# Patient Record
Sex: FEMALE | URBAN - METROPOLITAN AREA
[De-identification: names, ages, dates, MRNs, and addresses within clinical notes are randomized per-mention and may not be internally consistent; named-entity substitution may affect disease eponyms.]

---

## 2023-02-06 ENCOUNTER — OFFICE VISIT (OUTPATIENT)
Dept: OBGYN CLINIC | Age: 33
End: 2023-02-06

## 2023-02-06 VITALS
HEIGHT: 66 IN | BODY MASS INDEX: 30.79 KG/M2 | DIASTOLIC BLOOD PRESSURE: 64 MMHG | TEMPERATURE: 98.2 F | WEIGHT: 191.6 LBS | HEART RATE: 117 BPM | SYSTOLIC BLOOD PRESSURE: 118 MMHG

## 2023-02-06 DIAGNOSIS — Z98.891 HX OF CESAREAN SECTION: ICD-10-CM

## 2023-02-06 DIAGNOSIS — Z32.01 POSITIVE PREGNANCY TEST: ICD-10-CM

## 2023-02-06 DIAGNOSIS — Z87.42 HX OF ABNORMAL CERVICAL PAP SMEAR: ICD-10-CM

## 2023-02-06 DIAGNOSIS — Z20.2 POSSIBLE EXPOSURE TO STD: ICD-10-CM

## 2023-02-06 DIAGNOSIS — Z76.89 ENCOUNTER TO ESTABLISH CARE WITH NEW DOCTOR: ICD-10-CM

## 2023-02-06 DIAGNOSIS — Z87.59 HISTORY OF POSTPARTUM HEMORRHAGE: ICD-10-CM

## 2023-02-06 DIAGNOSIS — Z12.39 SCREENING BREAST EXAMINATION: ICD-10-CM

## 2023-02-06 DIAGNOSIS — R11.0 NAUSEA: ICD-10-CM

## 2023-02-06 DIAGNOSIS — Z12.4 PAP SMEAR FOR CERVICAL CANCER SCREENING: ICD-10-CM

## 2023-02-06 DIAGNOSIS — N91.2 AMENORRHEA: ICD-10-CM

## 2023-02-06 DIAGNOSIS — Z01.419 WOMEN'S ANNUAL ROUTINE GYNECOLOGICAL EXAMINATION: Primary | ICD-10-CM

## 2023-02-06 LAB
BACTERIA: ABNORMAL /HPF
BILIRUBIN URINE: NEGATIVE
BLOOD, URINE: NEGATIVE
CLARITY: CLEAR
COLOR: YELLOW
EPITHELIAL CELLS, UA: 8 /HPF (ref 0–5)
GLUCOSE URINE: NEGATIVE MG/DL
HYALINE CASTS: 0 /LPF (ref 0–8)
KETONES, URINE: NEGATIVE MG/DL
LEUKOCYTE ESTERASE, URINE: NEGATIVE
MICROSCOPIC EXAMINATION: ABNORMAL
NITRITE, URINE: NEGATIVE
PH UA: 5.5 (ref 5–8)
PROTEIN UA: NEGATIVE MG/DL
RBC UA: 3 /HPF (ref 0–4)
SPECIFIC GRAVITY UA: 1.03 (ref 1–1.03)
URINE TYPE: ABNORMAL
UROBILINOGEN, URINE: 0.2 E.U./DL
WBC UA: 2 /HPF (ref 0–5)

## 2023-02-06 PROCEDURE — 81025 URINE PREGNANCY TEST: CPT | Performed by: OBSTETRICS & GYNECOLOGY

## 2023-02-06 PROCEDURE — 99385 PREV VISIT NEW AGE 18-39: CPT | Performed by: OBSTETRICS & GYNECOLOGY

## 2023-02-06 RX ORDER — ONDANSETRON 4 MG/1
4 TABLET, FILM COATED ORAL EVERY 8 HOURS PRN
Qty: 30 TABLET | Refills: 3 | Status: SHIPPED | OUTPATIENT
Start: 2023-02-06

## 2023-02-06 NOTE — PROGRESS NOTES
[facility] Ob/Gyn   Annual Exam     CC:   Chief Complaint   Patient presents with    New Patient    Amenorrhea        HPI:  34 y.o. Altagracia Costa presents for her gynecologic exam. She complains of Amenorrhea. Patient's last menstrual period was 10/13/2022. Sanjay Kunz Has had a (+) home pregnancy test. Denies any VB / Cramping since that time. Admits to some nausea/vomiting. Is on prenatal vitamin. Previous pregnancies: Emergency CS, IOL for post dates, Female 8# Richard Issa, suspected Chorio -- need records from previous OB (Dr. Tiffany Mazariegos, in [place]). Screening:  Last pap smear: 2020 -- abnormal, repeat OK per pt  History of abnormal pap smears: Y  STI History: N      Review of Systems:   Review of Systems   Constitutional:  Negative for activity change, appetite change and fatigue. Eyes:  Negative for photophobia and visual disturbance. Respiratory:  Negative for shortness of breath. Cardiovascular:  Negative for chest pain, palpitations and leg swelling. Gastrointestinal:  Positive for nausea. Negative for abdominal pain and vomiting. Genitourinary:  Negative for difficulty urinating.        (+) absence of Menses   (+) breast tenderness    Skin:  Negative for color change. Neurological:  Negative for dizziness and numbness. Hematological:  Negative for adenopathy. Does not bruise/bleed easily. Psychiatric/Behavioral:  Negative for behavioral problems. The patient is not nervous/anxious. Primary Care Physician: No primary care provider on file. Obstetric History  OB History    Para Term  AB Living   1 1 1     1   SAB IAB Ectopic Molar Multiple Live Births             1      # Outcome Date GA Lbr Omid/2nd Weight Sex Delivery Anes PTL Lv   1 Term 21    F CS-LTranv   ANN       Gynecologic History  Menstrual History:  Menarche: early teens  LMP: Patient's last menstrual period was 10/13/2022.     Menstrual Period: regular    Sexual History:  Contraception: see above  Currently IS sexually active  Denies sexual problems    Medical History:  Past Medical History:   Diagnosis Date    Abnormal Pap smear of cervix     History of blood transfusion        Medications:  Current Outpatient Medications   Medication Sig Dispense Refill    Prenatal MV-Min-Fe Fum-FA-DHA (PRENATAL 1 PO) Take by mouth      FOLIC ACID PO Take by mouth      ondansetron (ZOFRAN) 4 MG tablet Take 1 tablet by mouth every 8 hours as needed for Nausea 30 tablet 3     No current facility-administered medications for this visit. Surgical History:  Past Surgical History:   Procedure Laterality Date     SECTION         Allergies: Allergies   Allergen Reactions    Sulfa Antibiotics        Family History:  Family History   Problem Relation Age of Onset    No Known Problems Father     No Known Problems Mother        Social History:  Social History     Socioeconomic History    Marital status: Single     Spouse name: None    Number of children: None    Years of education: None    Highest education level: None   Tobacco Use    Smoking status: Never    Smokeless tobacco: Never   Substance and Sexual Activity    Alcohol use: Not Currently    Drug use: Never       Objective: Body mass index is 30.93 kg/m². /64   Pulse (!) 117   Temp 98.2 °F (36.8 °C) (Infrared)   Ht 5' 6\" (1.676 m)   Wt 191 lb 9.6 oz (86.9 kg)   LMP 10/13/2022   BMI 30.93 kg/m²   General: Alert, well appearing, no acute distress  Head: Normocephalic, atraumatic  Mouth: Mucous membranes moist, pharynx normal without lesions  Thyroid: No thyromegaly or masses present   Breasts: Symmetric, non-tender to palpation, no skin changes, palpable axillary lymph nodes or masses noted  Lungs: Respiratory effort normal   CV: Regular rate.   Abdomen: Soft, nontender, nondistended   Pelvic:   External: normal appearing genitalia without masses, tenderness or lesions  Vagina: moist, pink mucosa, without abnormal discharge or lesions  Cervix: no masses or lesions visualized, no cervical motion tenderness  Uterus: mobile, midline, no masses palpated  Adnexa: mobile, non-tender to palpation, without masses  Rectovaginal: deferred  Extremities: No redness or tenderness, neg Lorenza's sign  Skin: Well perfused, normal coloration and turgor, no lesions or rashes visualized  Neuro: Alert, oriented, normal speech, no focal deficits, moves extremities appropriately  Osteopathic: No TART changes    Imaging:  OBSTETRIC ULTRASOUND--1ST TRIMESTER     DATE: 2/6/23     PHYSICIAN: AMI Lora.      SONOGRAPHER: SUJEY Adame [facility]     INDICATION: Amenorrhea     TYPE OF SCAN:  abdominal     FINDINGS:       The cul de sac is normal.  The cervix is normal.  The uterus is gravid. The uterus measures 10.27 cm x 8.09 cm x 10.03 cm. No uterine anomalies are evident. The right ovary is not visualized. The left ovary is not visualized. There is a single intrauterine pregnancy identified. A fetal pole is noted with a CRL measuring 5.08 cm, consistent with gestational age of 9weeks and 5days and EDC of 8/23/23. There is a 34 day discrepancy when compared with the gestational age of 16weeks and 4days and EDC of 7/20/23 set by FDP (10/13/22). Yolk sac is absent   Fetal cardiac activity is present at 175 bpm.      IMPRESSION:    Single IUP with cardiac activity, consistent with gestational age of 9weeks and 5days and EDC of 8/23/23 by US today (2/6/23)         Assessment/Plan:  34 y.o. Calixto Benítez presenting for her annual exam with (+) preg test:     Diagnosis Orders   1. Women's annual routine gynecological examination        2. Encounter to establish care with new doctor        3. Pap smear for cervical cancer screening  PAP SMEAR      4. Screening breast examination        5. Positive pregnancy test  POCT urine pregnancy    Culture, Urine    Urinalysis with Microscopic      6. Possible exposure to STD  Vaginal Pathogens Probes *A    C.trachomatis N.gonorrhoeae DNA      7.  Hx of  section        8. Nausea  ondansetron (ZOFRAN) 4 MG tablet      9. Amenorrhea        10. History of postpartum hemorrhage        11. Hx of abnormal cervical Pap smear           - reviewed US results, consistent with gestational age of 9weeks and 5days and EDC of 23 by [address] today (23)   - continue prenatal vitamin  - reviewed genetic screening options -- declined   - nausea: Rx for Zofran to pharmacy   - Hx of emergent CS: will consider TOLAC vs RCS, records released signed   - Hx of abnormal PAP: repeat pending / STI screen pending   - apt schedule, office policies and prenatal folder reviewed, questions or concerns addressed   - early OB return precautions reviewed     Follow Up  - Will call patient with results   -Return in about 4 weeks (around 3/6/2023) for Initial Prenatal Visit.     Lorraine Nava DO

## 2023-02-07 LAB
CANDIDA SPECIES, DNA PROBE: ABNORMAL
GARDNERELLA VAGINALIS, DNA PROBE: ABNORMAL
TRICHOMONAS VAGINALIS DNA: ABNORMAL
URINE CULTURE, ROUTINE: NORMAL

## 2023-02-08 LAB
C TRACH DNA GENITAL QL NAA+PROBE: NEGATIVE
HPV COMMENT: NORMAL
HPV TYPE 16: NOT DETECTED
HPV TYPE 18: NOT DETECTED
HPVOH (OTHER TYPES): NOT DETECTED
N. GONORRHOEAE DNA: NEGATIVE

## 2023-02-09 ASSESSMENT — ENCOUNTER SYMPTOMS
SHORTNESS OF BREATH: 0
PHOTOPHOBIA: 0
COLOR CHANGE: 0
NAUSEA: 1
VOMITING: 0
ABDOMINAL PAIN: 0

## 2023-03-06 ENCOUNTER — INITIAL PRENATAL (OUTPATIENT)
Dept: OBGYN CLINIC | Age: 33
End: 2023-03-06

## 2023-03-06 VITALS
WEIGHT: 195.8 LBS | SYSTOLIC BLOOD PRESSURE: 130 MMHG | DIASTOLIC BLOOD PRESSURE: 84 MMHG | HEART RATE: 113 BPM | BODY MASS INDEX: 31.6 KG/M2

## 2023-03-06 DIAGNOSIS — Z30.09 FAMILY PLANNING: ICD-10-CM

## 2023-03-06 DIAGNOSIS — R51.9 PREGNANCY HEADACHE IN SECOND TRIMESTER: ICD-10-CM

## 2023-03-06 DIAGNOSIS — Z80.9 FAMILY HISTORY OF CANCER: ICD-10-CM

## 2023-03-06 DIAGNOSIS — J30.2 SEASONAL ALLERGIES: ICD-10-CM

## 2023-03-06 DIAGNOSIS — Z34.82 PRENATAL CARE, SUBSEQUENT PREGNANCY IN SECOND TRIMESTER: Primary | ICD-10-CM

## 2023-03-06 DIAGNOSIS — Z98.891 HX OF EMERGENCY CESAREAN SECTION: ICD-10-CM

## 2023-03-06 DIAGNOSIS — O26.892 PREGNANCY HEADACHE IN SECOND TRIMESTER: ICD-10-CM

## 2023-03-06 RX ORDER — BUTALBITAL, ACETAMINOPHEN AND CAFFEINE 50; 325; 40 MG/1; MG/1; MG/1
1 TABLET ORAL EVERY 4 HOURS PRN
Qty: 180 TABLET | Refills: 3 | Status: SHIPPED | OUTPATIENT
Start: 2023-03-06

## 2023-03-06 RX ORDER — LORATADINE 10 MG/1
10 TABLET ORAL DAILY
Qty: 90 TABLET | Refills: 0 | Status: SHIPPED | OUTPATIENT
Start: 2023-03-06

## 2023-03-06 NOTE — PROGRESS NOTES
34 y.o.  at 15w5d EGA Estimated Date of Delivery: 23 here for Initial Prenatal Visit:     Pt seen and examined. No concerns/complaints. Denies VB, LOF, CTX. Denies FM yet. Denies fevers / chills / chest pain / shortness of breath. Denies HA, changes with vision, RUQ pain, edema. MWQ reviewed -- doing well      Previous pregnancies: Emergency CS, IOL for post dates, Female 8# Jose Francisco Hummel, suspected Chorio -- need records from previous OB (Dr. Gaspar Closs, in [place]). Objective:  /84   Pulse (!) 113   Wt 195 lb 12.8 oz (88.8 kg)   LMP 10/13/2022   BMI 31.60 kg/m²   Gen: AO, NAD  Abd: Soft, NT, gravid    FHT: 150 bpm   Ext: Mild LE edema  OMM: Increased lumbar lordosis    Assessment/Plan:   Diagnosis Orders   1. Prenatal care, subsequent pregnancy in second trimester  HIV Screen    Hepatitis B Surface Antigen    TSH with Reflex    CBC with Auto Differential    Type and Screen    Varicella Zoster Antibody, IgG    Rubella antibody, IgG    Syphilis Antibody Cascading Reflex    Drug Screen Multi Urine With Bup    Hep C AB RLFX HCV PCR-A      2. Pregnancy headache in second trimester  butalbital-acetaminophen-caffeine (FIORICET, ESGIC) -40 MG per tablet      3. Seasonal allergies  loratadine (CLARITIN) 10 MG tablet      4. Family history of cancer        5. Hx of emergency  section        6.  Family planning           Prenatal Care, Subsequent pregnancy   Reassuring fetal / maternal status today  Aneuploidy / Carrier Screen: Declines    AFP: Declines   PNL: O+/ab(-), RI, HepBnR, HIVnR, RPRnR, Varicella Immune, TSH 1.31, Hgb 13.6, Plt 245, UDS neg, UCx neg, GCCT neg, Pap NILM HPV neg 23     Anatomy: at around 20 wks   28 wk: GCT  Hgb Plt   Tdap: at 28 wks    GBS: 35-37 wks   Birth Plan: TOLAC vs RLTCS discussed, undecided   Considering Salpingectomy at time of RCS, Confirming family hx    Breastfeeding Education:     Hx of CS -- See above     Maternal Obesity   Pregravid BMI 30.84 Total Weight Gain: 4 lb 12.8 oz (2.177 kg)   Recommend weight gain 11-20lbs, healthy habits reviewed  Consider anatomy at 20 wks with  MFM   Plan for Growth at 36 wks and  testing at 37 wks     Seasonal Allergies -- Claritin sent to pharm     Follow Up  Return OB precautions reviewed   Return in about 4 weeks (around 4/3/2023) for Return OB visit. Harriet Cruz DO    Approximately 10 minutes spent in room counseling patient on condition and coordination of care with over 50% in direct face to face counseling.

## 2023-03-07 LAB
ABO/RH: NORMAL
AMPHETAMINE SCREEN, URINE: NORMAL
ANTIBODY SCREEN: NORMAL
BARBITURATE SCREEN URINE: NORMAL
BASOPHILS ABSOLUTE: 0 K/UL (ref 0–0.2)
BASOPHILS RELATIVE PERCENT: 0 %
BENZODIAZEPINE SCREEN, URINE: NORMAL
BUPRENORPHINE URINE: NORMAL
CANNABINOID SCREEN URINE: NORMAL
COCAINE METABOLITE SCREEN URINE: NORMAL
EOSINOPHILS ABSOLUTE: 0.4 K/UL (ref 0–0.6)
EOSINOPHILS RELATIVE PERCENT: 4 %
FENTANYL SCREEN, URINE: NORMAL
HCT VFR BLD CALC: 40.5 % (ref 36–48)
HEMOGLOBIN: 13.6 G/DL (ref 12–16)
HEPATITIS B SURFACE ANTIGEN INTERPRETATION: NORMAL
HEPATITIS C VIRUS AB BY CIA INDEX: 0.08 IV
HEPATITIS C VIRUS AB BY CIA INTERPRETATION: NEGATIVE
HIV AG/AB: NORMAL
HIV ANTIGEN: NORMAL
HIV-1 ANTIBODY: NORMAL
HIV-2 AB: NORMAL
LYMPHOCYTES ABSOLUTE: 2.7 K/UL (ref 1–5.1)
LYMPHOCYTES RELATIVE PERCENT: 28 %
Lab: NORMAL
MCH RBC QN AUTO: 29.2 PG (ref 26–34)
MCHC RBC AUTO-ENTMCNC: 33.5 G/DL (ref 31–36)
MCV RBC AUTO: 87.3 FL (ref 80–100)
METHADONE SCREEN, URINE: NORMAL
MONOCYTES ABSOLUTE: 0.8 K/UL (ref 0–1.3)
MONOCYTES RELATIVE PERCENT: 8 %
NEUTROPHILS ABSOLUTE: 5.8 K/UL (ref 1.7–7.7)
NEUTROPHILS RELATIVE PERCENT: 60 %
OPIATE SCREEN URINE: NORMAL
OXYCODONE URINE: NORMAL
PDW BLD-RTO: 13 % (ref 12.4–15.4)
PH UA: 7
PHENCYCLIDINE SCREEN URINE: NORMAL
PLATELET # BLD: 245 K/UL (ref 135–450)
PLATELET SLIDE REVIEW: ADEQUATE
PMV BLD AUTO: 8.6 FL (ref 5–10.5)
RBC # BLD: 4.64 M/UL (ref 4–5.2)
RBC # BLD: NORMAL 10*6/UL
RUBELLA ANTIBODY IGG: 286.8 IU/ML
SLIDE REVIEW: ABNORMAL
TOTAL SYPHILLIS IGG/IGM: NORMAL
TSH REFLEX: 1.31 UIU/ML (ref 0.27–4.2)
VACUOLATED NEUTROPHILS: PRESENT
VARICELLA-ZOSTER VIRUS AB, IGG: NORMAL
WBC # BLD: 9.6 K/UL (ref 4–11)

## 2023-03-10 ENCOUNTER — NURSE ONLY (OUTPATIENT)
Dept: OBGYN CLINIC | Age: 33
End: 2023-03-10

## 2023-03-10 DIAGNOSIS — Z34.82 PRENATAL CARE, SUBSEQUENT PREGNANCY IN SECOND TRIMESTER: ICD-10-CM

## 2023-03-11 LAB
BANDED NEUTROPHILS RELATIVE PERCENT: 5 % (ref 0–7)
BASOPHILS ABSOLUTE: 0 K/UL (ref 0–0.2)
BASOPHILS RELATIVE PERCENT: 0 %
EOSINOPHILS ABSOLUTE: 0 K/UL (ref 0–0.6)
EOSINOPHILS RELATIVE PERCENT: 0 %
HCT VFR BLD CALC: 39.1 % (ref 36–48)
HEMOGLOBIN: 13.5 G/DL (ref 12–16)
LYMPHOCYTES ABSOLUTE: 1.9 K/UL (ref 1–5.1)
LYMPHOCYTES RELATIVE PERCENT: 20 %
MCH RBC QN AUTO: 29.7 PG (ref 26–34)
MCHC RBC AUTO-ENTMCNC: 34.6 G/DL (ref 31–36)
MCV RBC AUTO: 85.8 FL (ref 80–100)
MONOCYTES ABSOLUTE: 0.6 K/UL (ref 0–1.3)
MONOCYTES RELATIVE PERCENT: 6 %
NEUTROPHILS ABSOLUTE: 7.2 K/UL (ref 1.7–7.7)
NEUTROPHILS RELATIVE PERCENT: 69 %
PDW BLD-RTO: 12.8 % (ref 12.4–15.4)
PLATELET # BLD: 219 K/UL (ref 135–450)
PMV BLD AUTO: 8.7 FL (ref 5–10.5)
RBC # BLD: 4.56 M/UL (ref 4–5.2)
RBC # BLD: NORMAL 10*6/UL
SLIDE REVIEW: NORMAL
WBC # BLD: 9.7 K/UL (ref 4–11)

## 2023-04-10 DIAGNOSIS — O26.892 PREGNANCY HEADACHE IN SECOND TRIMESTER: ICD-10-CM

## 2023-04-10 DIAGNOSIS — J30.2 SEASONAL ALLERGIES: ICD-10-CM

## 2023-04-10 DIAGNOSIS — R51.9 PREGNANCY HEADACHE IN SECOND TRIMESTER: ICD-10-CM

## 2023-04-12 RX ORDER — BUTALBITAL, ACETAMINOPHEN AND CAFFEINE 50; 325; 40 MG/1; MG/1; MG/1
1 TABLET ORAL EVERY 4 HOURS PRN
Qty: 180 TABLET | Refills: 3 | OUTPATIENT
Start: 2023-04-12

## 2023-04-12 RX ORDER — LORATADINE 10 MG/1
10 TABLET ORAL DAILY
Qty: 90 TABLET | Refills: 0 | OUTPATIENT
Start: 2023-04-12

## 2023-05-08 ENCOUNTER — ROUTINE PRENATAL (OUTPATIENT)
Dept: OBGYN CLINIC | Age: 33
End: 2023-05-08
Payer: COMMERCIAL

## 2023-05-08 VITALS
SYSTOLIC BLOOD PRESSURE: 120 MMHG | BODY MASS INDEX: 32.77 KG/M2 | WEIGHT: 203 LBS | DIASTOLIC BLOOD PRESSURE: 70 MMHG | HEART RATE: 102 BPM

## 2023-05-08 DIAGNOSIS — Z80.9 FAMILY HISTORY OF CANCER: ICD-10-CM

## 2023-05-08 DIAGNOSIS — Z98.891 HISTORY OF C-SECTION: ICD-10-CM

## 2023-05-08 DIAGNOSIS — O43.112 CIRCUMVALLATE PLACENTA IN SECOND TRIMESTER: ICD-10-CM

## 2023-05-08 DIAGNOSIS — O99.212 MATERNAL OBESITY SYNDROME IN SECOND TRIMESTER: ICD-10-CM

## 2023-05-08 DIAGNOSIS — Z34.92 PRENATAL CARE IN SECOND TRIMESTER: Primary | ICD-10-CM

## 2023-05-08 PROCEDURE — 99213 OFFICE O/P EST LOW 20 MIN: CPT | Performed by: OBSTETRICS & GYNECOLOGY

## 2023-05-08 PROCEDURE — G8427 DOCREV CUR MEDS BY ELIG CLIN: HCPCS | Performed by: OBSTETRICS & GYNECOLOGY

## 2023-05-08 PROCEDURE — G8417 CALC BMI ABV UP PARAM F/U: HCPCS | Performed by: OBSTETRICS & GYNECOLOGY

## 2023-05-08 PROCEDURE — 1036F TOBACCO NON-USER: CPT | Performed by: OBSTETRICS & GYNECOLOGY

## 2023-05-08 NOTE — PROGRESS NOTES
34 y.o.  at 24w5d EGA Estimated Date of Delivery: 23 here for RADHA:     Pt seen and examined. No concerns/complaints. Denies VB, LOF, CTX. Endorses (+) FM. Denies fevers / chills. She reports headaches have improved with medication. Denies HA, changes with vision, RUQ pain, edema. MWQ reviewed. Patient score 4. Objective:  /70   Pulse (!) 102   Wt 203 lb (92.1 kg)   LMP 10/13/2022   BMI 32.77 kg/m²   Gen: AO, NAD  Abd: Soft, NT, gravid   Ext: Mild LE edema    US reviewed and previa no longer present    Assessment/Plan:   Diagnosis Orders   1. Prenatal care in second trimester        2. Maternal obesity syndrome in second trimester        3. History of         4. Family history of cancer        5. Circumvallate placenta in second trimester         - reassuring maternal / fetal status   -reviewed follow up US that no longer showed previa. Discussed with patient   -Plan for GCT and CBC at next visit   -Discussed tdap   -PTL precautions   -Patient desires sterilization with salpingectomy. Discussed different management options. Stressed permanence of procedure. Consent form signed. -RTC In 4 weeks or sooner   -Growth US next visit   -All questions answered and patient verbalized understanding of plan. Follow Up  Return OB precautions reviewed   Return in about 4 weeks (around 2023) for gct and cbc, us growth . Approximately 10 minutes spent in room counseling patient on condition and coordination of care with over 50% in direct face to face counseling.      Salo Joiner,

## 2023-06-16 PROBLEM — Z34.83 PRENATAL CARE, SUBSEQUENT PREGNANCY IN THIRD TRIMESTER: Status: ACTIVE | Noted: 2023-05-08

## 2023-06-23 ENCOUNTER — ROUTINE PRENATAL (OUTPATIENT)
Dept: OBGYN CLINIC | Age: 33
End: 2023-06-23

## 2023-06-23 VITALS
HEART RATE: 109 BPM | BODY MASS INDEX: 32.99 KG/M2 | WEIGHT: 204.4 LBS | DIASTOLIC BLOOD PRESSURE: 84 MMHG | SYSTOLIC BLOOD PRESSURE: 132 MMHG

## 2023-06-23 DIAGNOSIS — O99.213 MATERNAL OBESITY SYNDROME IN THIRD TRIMESTER: ICD-10-CM

## 2023-06-23 DIAGNOSIS — O44.02 PLACENTA PREVIA IN SECOND TRIMESTER: ICD-10-CM

## 2023-06-23 DIAGNOSIS — Z34.83 PRENATAL CARE, SUBSEQUENT PREGNANCY IN THIRD TRIMESTER: Primary | ICD-10-CM

## 2023-06-23 DIAGNOSIS — O43.113 CIRCUMVALLATE PLACENTA IN THIRD TRIMESTER: ICD-10-CM

## 2023-06-23 DIAGNOSIS — Z80.9 FAMILY HISTORY OF CANCER: ICD-10-CM

## 2023-06-23 DIAGNOSIS — Z98.891 HISTORY OF C-SECTION: ICD-10-CM

## 2023-06-23 NOTE — PROGRESS NOTES
34 y.o.  at [facility] Estimated Date of Delivery: 23 here for RADHA Visit:     Pt seen and examined. No concerns/complaints except for fatigue and insomnia. Denies VB, LOF, CTX. (+) FM. Denies fevers / chills / chest pain / shortness of breath. Denies HA, changes with vision, RUQ pain, edema. MWQ reviewed -- score 1. Previous pregnancies: Emergency CS, IOL for post dates, Female 8# Dex Noel, suspected Chorio -- need records from previous OB (Dr. Obi Sanon, in [place]). Objective:  /84   Pulse (!) 109   Wt 204 lb 6.4 oz (92.7 kg)   LMP 10/13/2022   BMI 32.99 kg/m²   Gen: AO, NAD  Abd: Soft, NT, gravid     via doppler   Ext: Mild LE edema  OMM: Increased lumbar lordosis    Assessment/Plan:   Diagnosis Orders   1. Prenatal care, subsequent pregnancy in third trimester        2. Circumvallate placenta in third trimester        3. Maternal obesity syndrome in third trimester        4. History of         5. Family history of cancer        6. Placenta previa in second trimester            Prenatal Care, Subsequent pregnancy   Reassuring fetal / maternal status today  Aneuploidy / Carrier Screen: Declines    AFP: Declines   PNL: O+/ab(-), RI, HepBnR, HIVnR, RPRnR, Varicella Immune, TSH 1.31, Hgb 13.6, Plt 245, UDS neg, UCx neg, GCCT neg, Pap NILM HPV neg 23     Anatomy: 23 -- normal male anatomy, EFW 38.8%ile, MVP normal, SO: All cardiac except aortic arch, orbits, diaphragm, lower extremities, ACI. Post placenta w/ complete previa, circumvallate placenta. CL 4.37cm no funnel. 28 wk: Hgb 12.1 / Plt 206 /    Tdap: given on 23   GBS: 35-37 wks   Birth Plan: TOLAC vs RLTCS discussed, undecided   Considering Salpingectomy at time of RCS, family hx of uterine cancer in grandmother  Scheduled on 23 (39+1 EGA) @ 0800 with AMI Bennett signed / Ethics approval pending   Breastfeeding Education:     Hx of CS -- See above     Maternal Obesity   Pregravid

## 2023-06-25 PROBLEM — O99.213 MATERNAL OBESITY SYNDROME IN THIRD TRIMESTER: Status: ACTIVE | Noted: 2023-05-08

## 2023-06-25 PROBLEM — Z80.9 FAMILY HISTORY OF CANCER: Status: ACTIVE | Noted: 2023-06-25

## 2023-06-25 PROBLEM — Z98.891 HISTORY OF C-SECTION: Status: ACTIVE | Noted: 2023-06-25

## 2023-06-25 PROBLEM — O43.113 CIRCUMVALLATE PLACENTA IN THIRD TRIMESTER: Status: ACTIVE | Noted: 2023-05-08

## 2023-06-25 PROBLEM — O44.02 PLACENTA PREVIA IN SECOND TRIMESTER: Status: ACTIVE | Noted: 2023-06-25

## 2023-07-05 ENCOUNTER — ROUTINE PRENATAL (OUTPATIENT)
Dept: OBGYN CLINIC | Age: 33
End: 2023-07-05

## 2023-07-05 DIAGNOSIS — Z80.9 FAMILY HISTORY OF CANCER: ICD-10-CM

## 2023-07-05 DIAGNOSIS — Z34.83 PRENATAL CARE, SUBSEQUENT PREGNANCY IN THIRD TRIMESTER: Primary | ICD-10-CM

## 2023-07-05 DIAGNOSIS — O43.113 CIRCUMVALLATE PLACENTA IN THIRD TRIMESTER: ICD-10-CM

## 2023-07-05 DIAGNOSIS — O99.213 MATERNAL OBESITY SYNDROME IN THIRD TRIMESTER: ICD-10-CM

## 2023-07-05 DIAGNOSIS — O44.02 PLACENTA PREVIA IN SECOND TRIMESTER: ICD-10-CM

## 2023-07-05 DIAGNOSIS — Z36.89 ENCOUNTER FOR ULTRASOUND TO CHECK FETAL GROWTH: ICD-10-CM

## 2023-07-05 DIAGNOSIS — Z98.891 HISTORY OF C-SECTION: ICD-10-CM

## 2023-07-05 SDOH — ECONOMIC STABILITY: FOOD INSECURITY: WITHIN THE PAST 12 MONTHS, YOU WORRIED THAT YOUR FOOD WOULD RUN OUT BEFORE YOU GOT MONEY TO BUY MORE.: NEVER TRUE

## 2023-07-05 SDOH — ECONOMIC STABILITY: HOUSING INSECURITY
IN THE LAST 12 MONTHS, WAS THERE A TIME WHEN YOU DID NOT HAVE A STEADY PLACE TO SLEEP OR SLEPT IN A SHELTER (INCLUDING NOW)?: NO

## 2023-07-05 SDOH — ECONOMIC STABILITY: INCOME INSECURITY: HOW HARD IS IT FOR YOU TO PAY FOR THE VERY BASICS LIKE FOOD, HOUSING, MEDICAL CARE, AND HEATING?: NOT HARD AT ALL

## 2023-07-05 SDOH — ECONOMIC STABILITY: FOOD INSECURITY: WITHIN THE PAST 12 MONTHS, THE FOOD YOU BOUGHT JUST DIDN'T LAST AND YOU DIDN'T HAVE MONEY TO GET MORE.: NEVER TRUE

## 2023-07-05 ASSESSMENT — PATIENT HEALTH QUESTIONNAIRE - PHQ9
SUM OF ALL RESPONSES TO PHQ9 QUESTIONS 1 & 2: 0
SUM OF ALL RESPONSES TO PHQ QUESTIONS 1-9: 0
2. FEELING DOWN, DEPRESSED OR HOPELESS: 0
SUM OF ALL RESPONSES TO PHQ QUESTIONS 1-9: 0
1. LITTLE INTEREST OR PLEASURE IN DOING THINGS: 0

## 2023-07-07 VITALS
BODY MASS INDEX: 32.95 KG/M2 | TEMPERATURE: 97.5 F | HEART RATE: 105 BPM | WEIGHT: 205 LBS | DIASTOLIC BLOOD PRESSURE: 90 MMHG | OXYGEN SATURATION: 97 % | HEIGHT: 66 IN | SYSTOLIC BLOOD PRESSURE: 138 MMHG

## 2023-07-13 DIAGNOSIS — J30.2 SEASONAL ALLERGIES: ICD-10-CM

## 2023-07-14 RX ORDER — LORATADINE 10 MG/1
TABLET ORAL
Qty: 90 TABLET | Refills: 0 | Status: SHIPPED | OUTPATIENT
Start: 2023-07-14

## 2023-07-17 ENCOUNTER — TELEPHONE (OUTPATIENT)
Dept: OBGYN CLINIC | Age: 33
End: 2023-07-17

## 2023-07-17 NOTE — TELEPHONE ENCOUNTER
FYI- Pt called reporting she was very nauseas today at work and threw up. Pt left work early to go home. Pt comes into our office tomorrow. Informed pt to hydrate well with water and eat small snacks vs big meals. Pt denies fever/body aches/chills. Pt will return call if symptoms worsen.

## 2023-07-18 ENCOUNTER — ROUTINE PRENATAL (OUTPATIENT)
Dept: OBGYN CLINIC | Age: 33
End: 2023-07-18
Payer: COMMERCIAL

## 2023-07-18 VITALS
HEART RATE: 123 BPM | BODY MASS INDEX: 33.38 KG/M2 | WEIGHT: 206.8 LBS | DIASTOLIC BLOOD PRESSURE: 87 MMHG | SYSTOLIC BLOOD PRESSURE: 130 MMHG

## 2023-07-18 DIAGNOSIS — O44.02 PLACENTA PREVIA IN SECOND TRIMESTER: ICD-10-CM

## 2023-07-18 DIAGNOSIS — O99.213 MATERNAL OBESITY SYNDROME IN THIRD TRIMESTER: ICD-10-CM

## 2023-07-18 DIAGNOSIS — Z98.891 HISTORY OF C-SECTION: ICD-10-CM

## 2023-07-18 DIAGNOSIS — Z34.83 PRENATAL CARE, SUBSEQUENT PREGNANCY IN THIRD TRIMESTER: Primary | ICD-10-CM

## 2023-07-18 DIAGNOSIS — Z80.9 FAMILY HISTORY OF CANCER: ICD-10-CM

## 2023-07-18 DIAGNOSIS — O43.113 CIRCUMVALLATE PLACENTA IN THIRD TRIMESTER: ICD-10-CM

## 2023-07-18 PROBLEM — O44.40 LOW-LYING PLACENTA: Status: ACTIVE | Noted: 2023-07-18

## 2023-07-18 PROCEDURE — G8427 DOCREV CUR MEDS BY ELIG CLIN: HCPCS | Performed by: OBSTETRICS & GYNECOLOGY

## 2023-07-18 PROCEDURE — G8417 CALC BMI ABV UP PARAM F/U: HCPCS | Performed by: OBSTETRICS & GYNECOLOGY

## 2023-07-18 PROCEDURE — 1036F TOBACCO NON-USER: CPT | Performed by: OBSTETRICS & GYNECOLOGY

## 2023-07-18 PROCEDURE — 99213 OFFICE O/P EST LOW 20 MIN: CPT | Performed by: OBSTETRICS & GYNECOLOGY

## 2023-07-18 NOTE — PROGRESS NOTES
Maternal emotional well being screening form completed and reviewed with patient. Current score is 0. Patient given referral to [address] [place] [address] ([phone]):  No

## 2023-08-01 ENCOUNTER — ROUTINE PRENATAL (OUTPATIENT)
Dept: OBGYN CLINIC | Age: 33
End: 2023-08-01
Payer: COMMERCIAL

## 2023-08-01 VITALS
SYSTOLIC BLOOD PRESSURE: 110 MMHG | HEART RATE: 93 BPM | BODY MASS INDEX: 33.25 KG/M2 | WEIGHT: 206 LBS | DIASTOLIC BLOOD PRESSURE: 78 MMHG

## 2023-08-01 DIAGNOSIS — Z36.9 ANTENATAL SCREENING ENCOUNTER: ICD-10-CM

## 2023-08-01 DIAGNOSIS — J30.2 SEASONAL ALLERGIES: ICD-10-CM

## 2023-08-01 DIAGNOSIS — O43.113 CIRCUMVALLATE PLACENTA IN THIRD TRIMESTER: ICD-10-CM

## 2023-08-01 DIAGNOSIS — Z98.891 HISTORY OF C-SECTION: ICD-10-CM

## 2023-08-01 DIAGNOSIS — O44.40 LOW-LYING PLACENTA: ICD-10-CM

## 2023-08-01 DIAGNOSIS — Z80.9 FAMILY HISTORY OF CANCER: ICD-10-CM

## 2023-08-01 DIAGNOSIS — O99.213 MATERNAL OBESITY SYNDROME IN THIRD TRIMESTER: ICD-10-CM

## 2023-08-01 DIAGNOSIS — Z34.83 PRENATAL CARE, SUBSEQUENT PREGNANCY IN THIRD TRIMESTER: Primary | ICD-10-CM

## 2023-08-01 PROCEDURE — 1036F TOBACCO NON-USER: CPT | Performed by: OBSTETRICS & GYNECOLOGY

## 2023-08-01 PROCEDURE — 99213 OFFICE O/P EST LOW 20 MIN: CPT | Performed by: OBSTETRICS & GYNECOLOGY

## 2023-08-01 PROCEDURE — G8427 DOCREV CUR MEDS BY ELIG CLIN: HCPCS | Performed by: OBSTETRICS & GYNECOLOGY

## 2023-08-01 PROCEDURE — G8417 CALC BMI ABV UP PARAM F/U: HCPCS | Performed by: OBSTETRICS & GYNECOLOGY

## 2023-08-01 NOTE — PROGRESS NOTES
Maternal emotional well being screening form completed and reviewed with patient. Current score is 0. Patient given referral to [address] [place] [address] ([phone]):  No
Amniotic fluid volume is normal. Based on sonographic criteria the estimated fetal age is 38weeks and 3days with EDC of 23. There is a 3 day discordance with the established EDC of 23. The patient has a fundal placenta that is adequate distance in relation to the internal cervical os. The evaluation of the lower uterine segment and cervix reveals normal appearing anatomy. The uterus is unremarkable/gravid. Maternal ovaries and adnexae are not well visualized due to the size of the uterus and patient's gravid state. Anatomy seen includes: heart, stomach, kidneys, bladder     IMPRESSION:  Single live IUP in the third trimester. Adequate interval fetal growth. BPP . Assessment/Plan:   Diagnosis Orders   1. Prenatal care, subsequent pregnancy in third trimester        2.  screening encounter  Culture, Strep B Screen, Vaginal/Rectal      3. Circumvallate placenta in third trimester        4. Maternal obesity syndrome in third trimester        5. History of         6. Family history of cancer        7. Seasonal allergies        8. Low-lying placenta (RESOLVED)          Prenatal Care, Subsequent pregnancy   Reassuring fetal / maternal status today  Aneuploidy / Carrier Screen: Declines    AFP: Declines   PNL: O+/ab(-), RI, HepBnR, HIVnR, RPRnR, Varicella Immune, TSH 1.31, Hgb 13.6, Plt 245, UDS neg, UCx neg, GCCT neg, Pap NILM HPV neg 23     Anatomy: 23 -- normal male anatomy, EFW 38.8%ile, MVP normal, SO: All cardiac except aortic arch, orbits, diaphragm, lower extremities, ACI. Post placenta w/ complete previa, circumvallate placenta. CL 4.37cm no funnel.    28 wk: Hgb 12.1 / Plt 206 /    Tdap: given on 23   GBS: neg   Birth Plan: TOLAC vs RLTCS discussed, undecided   Considering Salpingectomy at time of RCS, family hx of uterine cancer in grandmother  Scheduled on 23 (39+0 EGA) @ 56 with Adwoa Escalona Brochure out of office)   Sterilization forms

## 2023-08-04 LAB — GP B STREP SPEC QL CULT: NORMAL

## 2023-08-10 ENCOUNTER — ROUTINE PRENATAL (OUTPATIENT)
Dept: OBGYN CLINIC | Age: 33
End: 2023-08-10
Payer: COMMERCIAL

## 2023-08-10 VITALS
DIASTOLIC BLOOD PRESSURE: 84 MMHG | HEART RATE: 102 BPM | SYSTOLIC BLOOD PRESSURE: 126 MMHG | BODY MASS INDEX: 33.77 KG/M2 | WEIGHT: 209.2 LBS

## 2023-08-10 DIAGNOSIS — Z34.83 PRENATAL CARE, SUBSEQUENT PREGNANCY IN THIRD TRIMESTER: Primary | ICD-10-CM

## 2023-08-10 DIAGNOSIS — O44.40 LOW-LYING PLACENTA: ICD-10-CM

## 2023-08-10 DIAGNOSIS — O99.213 MATERNAL OBESITY SYNDROME IN THIRD TRIMESTER: ICD-10-CM

## 2023-08-10 DIAGNOSIS — Z98.891 HISTORY OF C-SECTION: ICD-10-CM

## 2023-08-10 DIAGNOSIS — O43.113 CIRCUMVALLATE PLACENTA IN THIRD TRIMESTER: ICD-10-CM

## 2023-08-10 DIAGNOSIS — Z80.9 FAMILY HISTORY OF CANCER: ICD-10-CM

## 2023-08-10 PROCEDURE — G8427 DOCREV CUR MEDS BY ELIG CLIN: HCPCS | Performed by: OBSTETRICS & GYNECOLOGY

## 2023-08-10 PROCEDURE — 99213 OFFICE O/P EST LOW 20 MIN: CPT | Performed by: OBSTETRICS & GYNECOLOGY

## 2023-08-10 PROCEDURE — 1036F TOBACCO NON-USER: CPT | Performed by: OBSTETRICS & GYNECOLOGY

## 2023-08-10 PROCEDURE — G8417 CALC BMI ABV UP PARAM F/U: HCPCS | Performed by: OBSTETRICS & GYNECOLOGY

## 2023-08-16 ENCOUNTER — HOSPITAL ENCOUNTER (INPATIENT)
Age: 33
LOS: 2 days | Discharge: HOME | End: 2023-08-18
Attending: OBSTETRICS & GYNECOLOGY | Admitting: OBSTETRICS & GYNECOLOGY
Payer: COMMERCIAL

## 2023-08-16 ENCOUNTER — ANESTHESIA EVENT (OUTPATIENT)
Dept: LABOR AND DELIVERY | Age: 33
End: 2023-08-16
Payer: COMMERCIAL

## 2023-08-16 ENCOUNTER — ANESTHESIA (OUTPATIENT)
Dept: LABOR AND DELIVERY | Age: 33
End: 2023-08-16
Payer: COMMERCIAL

## 2023-08-16 DIAGNOSIS — Z90.79 STATUS POST BILATERAL SALPINGECTOMY: ICD-10-CM

## 2023-08-16 DIAGNOSIS — Z98.891 S/P REPEAT LOW TRANSVERSE C-SECTION: Primary | ICD-10-CM

## 2023-08-16 LAB
ABO + RH BLD: NORMAL
AMPHETAMINES UR QL SCN>1000 NG/ML: NORMAL
BARBITURATES UR QL SCN>200 NG/ML: NORMAL
BASOPHILS # BLD: 0 K/UL (ref 0–0.2)
BASOPHILS # BLD: 0 K/UL (ref 0–0.2)
BASOPHILS NFR BLD: 0.3 %
BASOPHILS NFR BLD: 0.4 %
BENZODIAZ UR QL SCN>200 NG/ML: NORMAL
BLD GP AB SCN SERPL QL: NORMAL
BUPRENORPHINE+NOR UR QL SCN: NORMAL
CANNABINOIDS UR QL SCN>50 NG/ML: NORMAL
COCAINE UR QL SCN: NORMAL
DEPRECATED RDW RBC AUTO: 13.1 % (ref 12.4–15.4)
DEPRECATED RDW RBC AUTO: 13.3 % (ref 12.4–15.4)
DRUG SCREEN COMMENT UR-IMP: NORMAL
EOSINOPHIL # BLD: 0.1 K/UL (ref 0–0.6)
EOSINOPHIL # BLD: 0.1 K/UL (ref 0–0.6)
EOSINOPHIL NFR BLD: 0.4 %
EOSINOPHIL NFR BLD: 1.6 %
FENTANYL SCREEN, URINE: NORMAL
HCT VFR BLD AUTO: 35.8 % (ref 36–48)
HCT VFR BLD AUTO: 36.2 % (ref 36–48)
HGB BLD-MCNC: 12 G/DL (ref 12–16)
HGB BLD-MCNC: 12.5 G/DL (ref 12–16)
LYMPHOCYTES # BLD: 1.3 K/UL (ref 1–5.1)
LYMPHOCYTES # BLD: 2 K/UL (ref 1–5.1)
LYMPHOCYTES NFR BLD: 11.3 %
LYMPHOCYTES NFR BLD: 23.2 %
MCH RBC QN AUTO: 29.2 PG (ref 26–34)
MCH RBC QN AUTO: 29.7 PG (ref 26–34)
MCHC RBC AUTO-ENTMCNC: 33.5 G/DL (ref 31–36)
MCHC RBC AUTO-ENTMCNC: 34.5 G/DL (ref 31–36)
MCV RBC AUTO: 86.2 FL (ref 80–100)
MCV RBC AUTO: 87.1 FL (ref 80–100)
METHADONE UR QL SCN>300 NG/ML: NORMAL
MONOCYTES # BLD: 0.8 K/UL (ref 0–1.3)
MONOCYTES # BLD: 0.8 K/UL (ref 0–1.3)
MONOCYTES NFR BLD: 6.8 %
MONOCYTES NFR BLD: 9.9 %
NEUTROPHILS # BLD: 5.6 K/UL (ref 1.7–7.7)
NEUTROPHILS # BLD: 9.5 K/UL (ref 1.7–7.7)
NEUTROPHILS NFR BLD: 64.9 %
NEUTROPHILS NFR BLD: 81.2 %
OPIATES UR QL SCN>300 NG/ML: NORMAL
OXYCODONE UR QL SCN: NORMAL
PCP UR QL SCN>25 NG/ML: NORMAL
PH UR STRIP: 6 [PH]
PLATELET # BLD AUTO: 164 K/UL (ref 135–450)
PLATELET # BLD AUTO: 187 K/UL (ref 135–450)
PMV BLD AUTO: 8.3 FL (ref 5–10.5)
PMV BLD AUTO: 8.6 FL (ref 5–10.5)
RBC # BLD AUTO: 4.11 M/UL (ref 4–5.2)
RBC # BLD AUTO: 4.2 M/UL (ref 4–5.2)
WBC # BLD AUTO: 11.8 K/UL (ref 4–11)
WBC # BLD AUTO: 8.6 K/UL (ref 4–11)

## 2023-08-16 PROCEDURE — 0UT70ZZ RESECTION OF BILATERAL FALLOPIAN TUBES, OPEN APPROACH: ICD-10-PCS | Performed by: OBSTETRICS & GYNECOLOGY

## 2023-08-16 PROCEDURE — 1220000000 HC SEMI PRIVATE OB R&B

## 2023-08-16 PROCEDURE — 7100000000 HC PACU RECOVERY - FIRST 15 MIN: Performed by: OBSTETRICS & GYNECOLOGY

## 2023-08-16 PROCEDURE — 6360000002 HC RX W HCPCS: Performed by: OBSTETRICS & GYNECOLOGY

## 2023-08-16 PROCEDURE — 86780 TREPONEMA PALLIDUM: CPT

## 2023-08-16 PROCEDURE — 36415 COLL VENOUS BLD VENIPUNCTURE: CPT

## 2023-08-16 PROCEDURE — 86900 BLOOD TYPING SEROLOGIC ABO: CPT

## 2023-08-16 PROCEDURE — 3609079900 HC CESAREAN SECTION: Performed by: OBSTETRICS & GYNECOLOGY

## 2023-08-16 PROCEDURE — 3700000000 HC ANESTHESIA ATTENDED CARE: Performed by: OBSTETRICS & GYNECOLOGY

## 2023-08-16 PROCEDURE — 6360000002 HC RX W HCPCS: Performed by: NURSE ANESTHETIST, CERTIFIED REGISTERED

## 2023-08-16 PROCEDURE — 80307 DRUG TEST PRSMV CHEM ANLYZR: CPT

## 2023-08-16 PROCEDURE — 59514 CESAREAN DELIVERY ONLY: CPT | Performed by: OBSTETRICS & GYNECOLOGY

## 2023-08-16 PROCEDURE — 86901 BLOOD TYPING SEROLOGIC RH(D): CPT

## 2023-08-16 PROCEDURE — 86850 RBC ANTIBODY SCREEN: CPT

## 2023-08-16 PROCEDURE — 2500000003 HC RX 250 WO HCPCS: Performed by: NURSE ANESTHETIST, CERTIFIED REGISTERED

## 2023-08-16 PROCEDURE — 2709999900 HC NON-CHARGEABLE SUPPLY: Performed by: OBSTETRICS & GYNECOLOGY

## 2023-08-16 PROCEDURE — 85025 COMPLETE CBC W/AUTO DIFF WBC: CPT

## 2023-08-16 PROCEDURE — 7100000001 HC PACU RECOVERY - ADDTL 15 MIN: Performed by: OBSTETRICS & GYNECOLOGY

## 2023-08-16 PROCEDURE — 88305 TISSUE EXAM BY PATHOLOGIST: CPT

## 2023-08-16 PROCEDURE — 6370000000 HC RX 637 (ALT 250 FOR IP): Performed by: OBSTETRICS & GYNECOLOGY

## 2023-08-16 PROCEDURE — 2580000003 HC RX 258: Performed by: OBSTETRICS & GYNECOLOGY

## 2023-08-16 PROCEDURE — 3700000001 HC ADD 15 MINUTES (ANESTHESIA): Performed by: OBSTETRICS & GYNECOLOGY

## 2023-08-16 RX ORDER — OXYTOCIN 10 [USP'U]/ML
INJECTION, SOLUTION INTRAMUSCULAR; INTRAVENOUS PRN
Status: DISCONTINUED | OUTPATIENT
Start: 2023-08-16 | End: 2023-08-16 | Stop reason: SDUPTHER

## 2023-08-16 RX ORDER — SODIUM CHLORIDE, SODIUM LACTATE, POTASSIUM CHLORIDE, CALCIUM CHLORIDE 600; 310; 30; 20 MG/100ML; MG/100ML; MG/100ML; MG/100ML
INJECTION, SOLUTION INTRAVENOUS CONTINUOUS
Status: DISCONTINUED | OUTPATIENT
Start: 2023-08-16 | End: 2023-08-16

## 2023-08-16 RX ORDER — LANOLIN 100 %
OINTMENT (GRAM) TOPICAL
Status: DISCONTINUED | OUTPATIENT
Start: 2023-08-16 | End: 2023-08-18 | Stop reason: HOSPADM

## 2023-08-16 RX ORDER — OXYCODONE HYDROCHLORIDE 5 MG/1
10 TABLET ORAL EVERY 6 HOURS PRN
Status: DISCONTINUED | OUTPATIENT
Start: 2023-08-16 | End: 2023-08-18 | Stop reason: HOSPADM

## 2023-08-16 RX ORDER — SODIUM CHLORIDE 0.9 % (FLUSH) 0.9 %
5-40 SYRINGE (ML) INJECTION PRN
Status: DISCONTINUED | OUTPATIENT
Start: 2023-08-16 | End: 2023-08-18 | Stop reason: HOSPADM

## 2023-08-16 RX ORDER — FAMOTIDINE 10 MG/ML
INJECTION, SOLUTION INTRAVENOUS
Status: DISCONTINUED
Start: 2023-08-16 | End: 2023-08-16

## 2023-08-16 RX ORDER — ACETAMINOPHEN 500 MG
1000 TABLET ORAL EVERY 8 HOURS SCHEDULED
Status: DISCONTINUED | OUTPATIENT
Start: 2023-08-16 | End: 2023-08-18 | Stop reason: HOSPADM

## 2023-08-16 RX ORDER — IBUPROFEN 600 MG/1
600 TABLET ORAL EVERY 8 HOURS SCHEDULED
Status: DISCONTINUED | OUTPATIENT
Start: 2023-08-16 | End: 2023-08-18 | Stop reason: HOSPADM

## 2023-08-16 RX ORDER — PRENATAL WITH FERROUS FUM AND FOLIC ACID 3080; 920; 120; 400; 22; 1.84; 3; 20; 10; 1; 12; 200; 27; 25; 2 [IU]/1; [IU]/1; MG/1; [IU]/1; MG/1; MG/1; MG/1; MG/1; MG/1; MG/1; UG/1; MG/1; MG/1; MG/1; MG/1
1 TABLET ORAL DAILY
Status: DISCONTINUED | OUTPATIENT
Start: 2023-08-17 | End: 2023-08-18 | Stop reason: HOSPADM

## 2023-08-16 RX ORDER — SODIUM CHLORIDE 0.9 % (FLUSH) 0.9 %
5-40 SYRINGE (ML) INJECTION EVERY 12 HOURS SCHEDULED
Status: DISCONTINUED | OUTPATIENT
Start: 2023-08-16 | End: 2023-08-18 | Stop reason: HOSPADM

## 2023-08-16 RX ORDER — ONDANSETRON 2 MG/ML
INJECTION INTRAMUSCULAR; INTRAVENOUS PRN
Status: DISCONTINUED | OUTPATIENT
Start: 2023-08-16 | End: 2023-08-16 | Stop reason: SDUPTHER

## 2023-08-16 RX ORDER — CEFAZOLIN SODIUM IN 0.9 % NACL 2 G/100 ML
2000 PLASTIC BAG, INJECTION (ML) INTRAVENOUS ONCE
Status: COMPLETED | OUTPATIENT
Start: 2023-08-16 | End: 2023-08-16

## 2023-08-16 RX ORDER — OMEPRAZOLE 10 MG/1
20 CAPSULE, DELAYED RELEASE ORAL DAILY
COMMUNITY

## 2023-08-16 RX ORDER — BUPIVACAINE HYDROCHLORIDE 7.5 MG/ML
INJECTION, SOLUTION INTRASPINAL PRN
Status: DISCONTINUED | OUTPATIENT
Start: 2023-08-16 | End: 2023-08-16 | Stop reason: SDUPTHER

## 2023-08-16 RX ORDER — FENTANYL CITRATE 50 UG/ML
INJECTION, SOLUTION INTRAMUSCULAR; INTRAVENOUS PRN
Status: DISCONTINUED | OUTPATIENT
Start: 2023-08-16 | End: 2023-08-16 | Stop reason: SDUPTHER

## 2023-08-16 RX ORDER — SODIUM CHLORIDE, SODIUM LACTATE, POTASSIUM CHLORIDE, AND CALCIUM CHLORIDE .6; .31; .03; .02 G/100ML; G/100ML; G/100ML; G/100ML
1000 INJECTION, SOLUTION INTRAVENOUS ONCE
Status: DISCONTINUED | OUTPATIENT
Start: 2023-08-16 | End: 2023-08-16

## 2023-08-16 RX ORDER — PROPOFOL 10 MG/ML
INJECTION, EMULSION INTRAVENOUS PRN
Status: DISCONTINUED | OUTPATIENT
Start: 2023-08-16 | End: 2023-08-16 | Stop reason: SDUPTHER

## 2023-08-16 RX ORDER — KETOROLAC TROMETHAMINE 30 MG/ML
30 INJECTION, SOLUTION INTRAMUSCULAR; INTRAVENOUS ONCE
Status: COMPLETED | OUTPATIENT
Start: 2023-08-16 | End: 2023-08-16

## 2023-08-16 RX ORDER — SODIUM CHLORIDE 9 MG/ML
INJECTION, SOLUTION INTRAVENOUS PRN
Status: DISCONTINUED | OUTPATIENT
Start: 2023-08-16 | End: 2023-08-18 | Stop reason: HOSPADM

## 2023-08-16 RX ORDER — EPHEDRINE SULFATE 50 MG/ML
INJECTION INTRAVENOUS PRN
Status: DISCONTINUED | OUTPATIENT
Start: 2023-08-16 | End: 2023-08-16 | Stop reason: SDUPTHER

## 2023-08-16 RX ORDER — SODIUM CHLORIDE 0.9 % (FLUSH) 0.9 %
10 SYRINGE (ML) INJECTION EVERY 12 HOURS SCHEDULED
Status: DISCONTINUED | OUTPATIENT
Start: 2023-08-16 | End: 2023-08-16

## 2023-08-16 RX ORDER — OXYCODONE HYDROCHLORIDE 5 MG/1
5 TABLET ORAL EVERY 6 HOURS PRN
Status: DISCONTINUED | OUTPATIENT
Start: 2023-08-16 | End: 2023-08-18 | Stop reason: HOSPADM

## 2023-08-16 RX ORDER — ONDANSETRON 2 MG/ML
4 INJECTION INTRAMUSCULAR; INTRAVENOUS EVERY 6 HOURS PRN
Status: DISCONTINUED | OUTPATIENT
Start: 2023-08-16 | End: 2023-08-18 | Stop reason: HOSPADM

## 2023-08-16 RX ORDER — SODIUM CHLORIDE 9 MG/ML
INJECTION, SOLUTION INTRAVENOUS PRN
Status: DISCONTINUED | OUTPATIENT
Start: 2023-08-16 | End: 2023-08-16

## 2023-08-16 RX ORDER — DIPHENHYDRAMINE HYDROCHLORIDE 50 MG/ML
25 INJECTION INTRAMUSCULAR; INTRAVENOUS EVERY 6 HOURS PRN
Status: DISCONTINUED | OUTPATIENT
Start: 2023-08-16 | End: 2023-08-18 | Stop reason: HOSPADM

## 2023-08-16 RX ORDER — ONDANSETRON 2 MG/ML
4 INJECTION INTRAMUSCULAR; INTRAVENOUS EVERY 6 HOURS PRN
Status: DISCONTINUED | OUTPATIENT
Start: 2023-08-16 | End: 2023-08-16

## 2023-08-16 RX ORDER — CITRIC ACID/SODIUM CITRATE 334-500MG
30 SOLUTION, ORAL ORAL ONCE
Status: DISCONTINUED | OUTPATIENT
Start: 2023-08-16 | End: 2023-08-16

## 2023-08-16 RX ORDER — FERROUS SULFATE 325(65) MG
325 TABLET ORAL
Status: DISCONTINUED | OUTPATIENT
Start: 2023-08-17 | End: 2023-08-18 | Stop reason: HOSPADM

## 2023-08-16 RX ORDER — MORPHINE SULFATE 1 MG/ML
INJECTION, SOLUTION EPIDURAL; INTRATHECAL; INTRAVENOUS PRN
Status: DISCONTINUED | OUTPATIENT
Start: 2023-08-16 | End: 2023-08-16 | Stop reason: SDUPTHER

## 2023-08-16 RX ORDER — SODIUM CHLORIDE, SODIUM LACTATE, POTASSIUM CHLORIDE, CALCIUM CHLORIDE 600; 310; 30; 20 MG/100ML; MG/100ML; MG/100ML; MG/100ML
INJECTION, SOLUTION INTRAVENOUS CONTINUOUS
Status: DISCONTINUED | OUTPATIENT
Start: 2023-08-16 | End: 2023-08-18 | Stop reason: HOSPADM

## 2023-08-16 RX ORDER — SODIUM CHLORIDE 0.9 % (FLUSH) 0.9 %
10 SYRINGE (ML) INJECTION PRN
Status: DISCONTINUED | OUTPATIENT
Start: 2023-08-16 | End: 2023-08-16

## 2023-08-16 RX ORDER — FAMOTIDINE 20 MG/1
20 TABLET, FILM COATED ORAL 2 TIMES DAILY
Status: DISCONTINUED | OUTPATIENT
Start: 2023-08-16 | End: 2023-08-18 | Stop reason: HOSPADM

## 2023-08-16 RX ORDER — DOCUSATE SODIUM 100 MG/1
100 CAPSULE, LIQUID FILLED ORAL 2 TIMES DAILY
Status: DISCONTINUED | OUTPATIENT
Start: 2023-08-16 | End: 2023-08-18 | Stop reason: HOSPADM

## 2023-08-16 RX ADMIN — ONDANSETRON 4 MG: 2 INJECTION INTRAMUSCULAR; INTRAVENOUS at 11:06

## 2023-08-16 RX ADMIN — EPHEDRINE SULFATE 10 MG: 50 INJECTION INTRAVENOUS at 11:16

## 2023-08-16 RX ADMIN — EPHEDRINE SULFATE 10 MG: 50 INJECTION INTRAVENOUS at 11:14

## 2023-08-16 RX ADMIN — FENTANYL CITRATE 15 MCG: 50 INJECTION, SOLUTION INTRAMUSCULAR; INTRAVENOUS at 11:09

## 2023-08-16 RX ADMIN — ONDANSETRON 4 MG: 2 INJECTION INTRAMUSCULAR; INTRAVENOUS at 16:36

## 2023-08-16 RX ADMIN — OXYTOCIN 20 UNITS: 10 INJECTION INTRAVENOUS at 11:37

## 2023-08-16 RX ADMIN — ACETAMINOPHEN 1000 MG: 500 TABLET ORAL at 16:35

## 2023-08-16 RX ADMIN — OXYTOCIN 10 UNITS: 10 INJECTION INTRAVENOUS at 11:50

## 2023-08-16 RX ADMIN — BUPIVACAINE HYDROCHLORIDE 1.6 ML: 7.5 INJECTION, SOLUTION SUBARACHNOID at 11:09

## 2023-08-16 RX ADMIN — MORPHINE SULFATE 0.2 MG: 1 INJECTION EPIDURAL; INTRATHECAL; INTRAVENOUS at 11:09

## 2023-08-16 RX ADMIN — SODIUM CHLORIDE, SODIUM LACTATE, POTASSIUM CHLORIDE, AND CALCIUM CHLORIDE: .6; .31; .03; .02 INJECTION, SOLUTION INTRAVENOUS at 11:50

## 2023-08-16 RX ADMIN — SODIUM CHLORIDE, SODIUM LACTATE, POTASSIUM CHLORIDE, AND CALCIUM CHLORIDE: .6; .31; .03; .02 INJECTION, SOLUTION INTRAVENOUS at 11:05

## 2023-08-16 RX ADMIN — Medication 2000 MG: at 11:16

## 2023-08-16 RX ADMIN — PROPOFOL 10 MG: 10 INJECTION, EMULSION INTRAVENOUS at 11:28

## 2023-08-16 RX ADMIN — EPHEDRINE SULFATE 10 MG: 50 INJECTION INTRAVENOUS at 11:10

## 2023-08-16 RX ADMIN — EPHEDRINE SULFATE 10 MG: 50 INJECTION INTRAVENOUS at 11:12

## 2023-08-16 RX ADMIN — KETOROLAC TROMETHAMINE 30 MG: 30 INJECTION, SOLUTION INTRAMUSCULAR at 16:37

## 2023-08-16 NOTE — H&P
Obstetrics History and Physical    CC: No chief complaint on file. HPI: Eduardo Pinedo is a 28 y.o. Cheryl Vick at 39w0d who presents for scheduled repeat  section. Denies vaginal bleeding. Denies leaking fluid. Reports some contractions. Endorses fetal movement. Previous pregnancies: Emergency CS, IOL for post dates, Female 8# David Mccurdy, suspected Chorio (Dr. Marlo Wolf, in [place]). Current pregnancy has been complicated by Maternal obesity (PG BMI 30), Circumvallate placenta, Hx CS x 1, family hx of GYN CA, Low lying placenta (resolved). Prenatal Flow:   - Genetic screening: declined    - Anatomy scan: 23  -- normal male anatomy, EFW 38.8%ile, MVP normal, SO: All cardiac except aortic arch, orbits, diaphragm, lower extremities, ACI. Post placenta w/ complete previa, circumvallate placenta. CL 4.37cm no funnel.   - Tdap: given 23   - PNL: O+/ab(-), RI, HepBnR, HIVnR, RPRnR, Varicella Immune, TSH 1.31, Hgb 13.6, Plt 245, UDS neg, UCx neg, GCCT neg, Pap NILM HPV neg 23    - 28 wk labs: Hgb 12.1 / Plt 206 /     - GBS: neg      Review of Systems: The following ROS was otherwise negative, except as noted in the HPI: constitutional, HEENT, respiratory, cardiovascular, gastrointestinal, genitourinary, skin, musculoskeletal, neurological, psych    OBGYN Provider : Dr. Madi Snyder    Obstetrical History:  OB History    Para Term  AB Living   2 1 1 0 0 1   SAB IAB Ectopic Molar Multiple Live Births   0 0 0 0 0 1      # Outcome Date GA Lbr Omid/2nd Weight Sex Delivery Anes PTL Lv   2 Current            1 Term 21    F CS-LTranv   ANN     Past Medical History:   Past Medical History:   Diagnosis Date    Abnormal Pap smear of cervix     History of blood transfusion        Medications:  No current facility-administered medications on file prior to encounter.      Current Outpatient Medications on File Prior to Encounter   Medication Sig Dispense Refill butalbital-acetaminophen-caffeine (FIORICET, ESGIC) -40 MG per tablet Take 1 tablet by mouth every 4 hours as needed for Headaches (Patient not taking: Reported on 2023) 180 tablet 3    Prenatal MV-Min-Fe Fum-FA-DHA (PRENATAL 1 PO) Take by mouth      FOLIC ACID PO Take by mouth (Patient not taking: Reported on 2023)      ondansetron (ZOFRAN) 4 MG tablet Take 1 tablet by mouth every 8 hours as needed for Nausea (Patient not taking: Reported on 2023) 30 tablet 3       Allergies:  Sulfa antibiotics    Surgical History:  Past Surgical History:   Procedure Laterality Date     SECTION         Family History:  Family History   Problem Relation Age of Onset    No Known Problems Father     No Known Problems Mother        Social History:  Social History     Substance and Sexual Activity   Alcohol Use Not Currently     Social History     Substance and Sexual Activity   Drug Use Never     Social History     Tobacco Use   Smoking Status Never   Smokeless Tobacco Never     Physical Exam:  LMP 10/13/2022   General: Alert, well appearing, no acute distress  Head: Normocephalic, atraumatic  Lungs: Clear to auscultation bilaterally without rales, rhonchi, wheezing  CV: Regular rate and regular rhythm, normal S1, S2 without murmurs, rubs, clicks or gallops. Abdomen: Gravid, soft, nontender   Uterus: midline, gravid  Adnexa: mobile, non-tender to palpation, without masses  Rectovaginal: deferred  Extremities: No redness or tenderness, neg Lorenza's sign  Skin: Well perfused, normal coloration and turgor, no lesions or rashes visualized  Neuro: Alert, oriented, normal speech, no focal deficits, moves extremities appropriately  Psych: Appropriate, normal affect, appears stated age    Fetal Heart Monitoring:  FHT: 150 baseline, mod lanie, +accels, neg decels  [place]: none    Labs:  No visits with results within 1 Day(s) from this visit.    Latest known visit with results is:   Routine Prenatal on 2023   Component

## 2023-08-16 NOTE — LACTATION NOTE
This note was copied from a baby's chart. Lactation Progress Note      Data:   Initial lactation consult with multip after LTCS per RN request.  MOB breast fed her 2year old daughter for about 6 months. Had a slow start with infant going to SCN, and triple feeding for first couple of weeks, alongside a PP hemorrhage. This infant is lying sts on chest with minimal feeding cues present. Will place infant at breast and allow to attempt breastfeeding. Action: Introduced self to patient as Lactation RN, name and phone number written on white board in room. Breastfeeding Booklet brought to bedside with contents reviewed regarding infant latch, positioning to breast, and how to know infant is getting what he needs (colostrum). Infant then brought down to left breast in cradle hold. MOB is able to support infant to breast. After a few attempts, adan was achieved with srs observed at breast over the next 15-20 minutes and continues after consult. MOB reports comfortable position and strong tugging present. Reviewed with mother what to expect over the next  24-48 hours with infant feedings, infant output, and breast care. Educated mother on how to hand express colostrum, and encouraged to do so with sleepy feeding attempts at breast q2-3 hours. Reviewed infant feeding cues and encouraged mother to allow infant to breast feed on demand, a minimum of 8-12 times a day after the first day of life. Also encouraged mother to avoid giving infant a pacifier or bottle for at least the first two weeks of life. Binder and breast feeding log reviewed, all questions answered. Mother instructed to call Lactation nurse for F/U care as needed. Response: CRISTA is pleased with infant's first feeding at breast. Verbalizes understanding of bf education that was provided. States that she will call for f/u care as needed.

## 2023-08-16 NOTE — ANESTHESIA PROCEDURE NOTES
Spinal Block    Patient location during procedure: OR  Reason for block: primary anesthetic  Staffing  Resident/CRNA: Clearance Libman, APRN - CRNA  Spinal Block  Patient position: sitting  Prep: ChloraPrep  Patient monitoring: continuous pulse ox and frequent blood pressure checks  Approach: midline  Location: L3/L4  Provider prep: mask and sterile gloves  Local infiltration: lidocaine  Needle  Needle type: Pencan   Needle gauge: 24 G  Needle length: 4 in  Assessment  Sensory level: T4  Swirl obtained: Yes  CSF: clear  Attempts: 1  Hemodynamics: stable  Preanesthetic Checklist  Completed: patient identified, IV checked, site marked, risks and benefits discussed, surgical/procedural consents, equipment checked, pre-op evaluation, timeout performed, anesthesia consent given, oxygen available, monitors applied/VS acknowledged and blood product R/B/A discussed and consented

## 2023-08-16 NOTE — L&D DELIVERY SUMMARY NOTE
cord was collected but later discarded due to fetal wellbeing. Cord blood was obtained for evaluation. The placenta was removed intact and appeared normal. The uterine outline, tubes and ovaries appeared normal. The uterine incision was closed with a double layer of running sutures of 0 Vicryl. Hemostasis was observed. The utero-vesical peritoneal reflection was re-approximated with 2-0 Vicryl. Attention was then turned to the Fallopian tubes. The Fallopian tubes were then identified and excised entirely in a stepwise fashion utilizing Chromic suture. Hemostasis was assured. The pelvic gutters were then cleared of all clots and debris. The peritoneum was re-approximated with 2-0 Vicryl in a running fashion. The fascia was closed using 0 Vicryl in a running fashion. Lavage was carried out until clear. The subcuticular layer was  re-approximated with single interrupted sutures of 2-0 Vicryl. The skin was reapproximated in a subcuticular fashion with 4-0 Vicryl. Steri strips, Mastisol and a sterile dressing were applied. Hemostasis was assured. Patient tolerated the procedure well and was taken to L&D room in stable condition. Instrument, sponge, and needle counts were correct prior the abdominal closure and at the conclusion of the case. Findings:  Viable male     Intake/Output:     Date 08/15/23 07 - 23 0700(Not Admitted) 23 0701 - 23 0700   Shift [id number] 7978-0180 24 Hour Total [id number] 1262-6486 24 Hour Total   INTAKE   I.V.    1200  1200   Shift Total    1200  1200   OUTPUT   Blood    591  591     Quantitative Blood Loss (mL)    591  591   Shift Total    591  591   NET    609  609     EBL: 591 cc   IVF: 1200 cc crystalloid  UO: 300 cc clear yellow urine. Drains: none                Specimens: placenta for hold specimen, bilateral Fallopian tubes. Implants: none           Complications:  none           Disposition: PACU - hemodynamically stable. Condition: infant stable and mother stable    Attending Attestation: I performed the procedure.

## 2023-08-17 PROBLEM — Z98.891 S/P REPEAT LOW TRANSVERSE C-SECTION: Status: ACTIVE | Noted: 2023-08-17

## 2023-08-17 PROBLEM — Z90.79 STATUS POST BILATERAL SALPINGECTOMY: Status: ACTIVE | Noted: 2023-08-17

## 2023-08-17 LAB
BASOPHILS # BLD: 0 K/UL (ref 0–0.2)
BASOPHILS NFR BLD: 0.4 %
DEPRECATED RDW RBC AUTO: 13.3 % (ref 12.4–15.4)
EOSINOPHIL # BLD: 0.1 K/UL (ref 0–0.6)
EOSINOPHIL NFR BLD: 1.6 %
HCT VFR BLD AUTO: 32.3 % (ref 36–48)
HGB BLD-MCNC: 11.1 G/DL (ref 12–16)
LYMPHOCYTES # BLD: 1.6 K/UL (ref 1–5.1)
LYMPHOCYTES NFR BLD: 18.2 %
MCH RBC QN AUTO: 29.9 PG (ref 26–34)
MCHC RBC AUTO-ENTMCNC: 34.4 G/DL (ref 31–36)
MCV RBC AUTO: 86.9 FL (ref 80–100)
MONOCYTES # BLD: 0.9 K/UL (ref 0–1.3)
MONOCYTES NFR BLD: 10.6 %
NEUTROPHILS # BLD: 6.1 K/UL (ref 1.7–7.7)
NEUTROPHILS NFR BLD: 69.2 %
PLATELET # BLD AUTO: 163 K/UL (ref 135–450)
PMV BLD AUTO: 8.3 FL (ref 5–10.5)
RBC # BLD AUTO: 3.72 M/UL (ref 4–5.2)
REAGIN+T PALLIDUM IGG+IGM SERPL-IMP: NORMAL
WBC # BLD AUTO: 8.8 K/UL (ref 4–11)

## 2023-08-17 PROCEDURE — 1220000000 HC SEMI PRIVATE OB R&B

## 2023-08-17 PROCEDURE — 6370000000 HC RX 637 (ALT 250 FOR IP): Performed by: OBSTETRICS & GYNECOLOGY

## 2023-08-17 PROCEDURE — 85025 COMPLETE CBC W/AUTO DIFF WBC: CPT

## 2023-08-17 PROCEDURE — 36415 COLL VENOUS BLD VENIPUNCTURE: CPT

## 2023-08-17 PROCEDURE — 2580000003 HC RX 258: Performed by: OBSTETRICS & GYNECOLOGY

## 2023-08-17 RX ADMIN — FAMOTIDINE 20 MG: 20 TABLET, FILM COATED ORAL at 08:39

## 2023-08-17 RX ADMIN — DOCUSATE SODIUM 100 MG: 100 CAPSULE, LIQUID FILLED ORAL at 20:21

## 2023-08-17 RX ADMIN — ACETAMINOPHEN 1000 MG: 500 TABLET ORAL at 16:45

## 2023-08-17 RX ADMIN — FAMOTIDINE 20 MG: 20 TABLET, FILM COATED ORAL at 00:53

## 2023-08-17 RX ADMIN — DOCUSATE SODIUM 100 MG: 100 CAPSULE, LIQUID FILLED ORAL at 08:39

## 2023-08-17 RX ADMIN — IBUPROFEN 600 MG: 600 TABLET ORAL at 12:10

## 2023-08-17 RX ADMIN — Medication 10 ML: at 08:43

## 2023-08-17 RX ADMIN — IBUPROFEN 600 MG: 600 TABLET ORAL at 20:21

## 2023-08-17 RX ADMIN — ACETAMINOPHEN 1000 MG: 500 TABLET ORAL at 08:39

## 2023-08-17 RX ADMIN — METFORMIN HYDROCHLORIDE 1 TABLET: 500 TABLET, EXTENDED RELEASE ORAL at 08:39

## 2023-08-17 RX ADMIN — ACETAMINOPHEN 1000 MG: 500 TABLET ORAL at 00:34

## 2023-08-17 RX ADMIN — FAMOTIDINE 20 MG: 20 TABLET, FILM COATED ORAL at 20:21

## 2023-08-17 RX ADMIN — IBUPROFEN 600 MG: 600 TABLET ORAL at 04:36

## 2023-08-17 ASSESSMENT — PAIN - FUNCTIONAL ASSESSMENT
PAIN_FUNCTIONAL_ASSESSMENT: ACTIVITIES ARE NOT PREVENTED
PAIN_FUNCTIONAL_ASSESSMENT: ACTIVITIES ARE NOT PREVENTED

## 2023-08-17 ASSESSMENT — PAIN DESCRIPTION - LOCATION
LOCATION: ABDOMEN;INCISION
LOCATION: ABDOMEN;INCISION

## 2023-08-17 ASSESSMENT — PAIN DESCRIPTION - ORIENTATION: ORIENTATION: LOWER

## 2023-08-17 ASSESSMENT — PAIN DESCRIPTION - DESCRIPTORS
DESCRIPTORS: ACHING
DESCRIPTORS: ACHING

## 2023-08-17 ASSESSMENT — PAIN SCALES - GENERAL
PAINLEVEL_OUTOF10: 3
PAINLEVEL_OUTOF10: 1

## 2023-08-17 NOTE — PROGRESS NOTES
[facility] Ob/Gyn  Post Partum Progress Note    Subjective:   Patient is a 28 y.o. y/o  female S/P repeat  section at 39w0d EGA. PPD # 1. The pregnancy was complicated by: Maternal obesity (PG BMI 30), Circumvallate placenta, Hx CS x 1, family hx of GYN CA, Low lying placenta (resolved). Doing well today. No current complaints are noted including headache, change in vision, fever, chills, chest pain, shortness of breath, nausea, vomiting, diarrhea or constipation. The patient denies calf tenderness. She has not ambulated or voided yet. Garrel Ridges is described as minimal. The patient plans to breastfeed. Objective:   GENERAL APPEARANCE: alert, well appearing, in no apparent distress  LUNGS: Resp effort normal   HEART: Reg rate   ABDOMEN POSTPARTUM: benign without masses or organomegaly palpable. Tender to palpation. Uterine Fundus firm, NT, Below umbilicus. Incision Dry/Clean/Intact. EXTREMITIES: no redness or tenderness in the calves or thighs, no edema  SKIN: normal coloration and turgor, no rashes, no suspicious skin lesions noted    Pertinent Labs:   H/H: 11.1/32.3 down from 12/35.8    Assessment / Plan:  1) Post partum    - meeting post partum milestones    - hemodynamically stable     2) O+ / RI / GBS neg    3) Baby Information    - Delivery Time / Date: 374 on    - APGARS: 8, 9    - Birth weight: 8 lb 2.9 oz    - Feeding: breastfeeding    - Gender: male, plans for circumcision    Disposition:   Post Partum Instructions reviewed   Anticipate discharge on PPD # 2-3 pending clinical status     Kirk Brewer DO, PGY-1  [facility] Medicine Residency    ATTENDING ADDENDUM:     I saw and evaluated pt on 2023. The case was discussed with resident physician. I personally reviewed the HPI, PH, FH, SH, ROS and medications. I repeated pertinent portions of the examination and reviewed the relevant imaging and laboratory data.  I agree with the findings,

## 2023-08-17 NOTE — LACTATION NOTE
This note was copied from a baby's chart. Lactation Progress Note      Data:     F/u during lactation rounds with multip breast feeder, 1 po. Mother reports baby continues latching and breast feeding well. Mother had to pump and bottle feed x 6 months for her first baby who spent time in the SCN, and struggled with low milk supply. Action: Introduced self as [address] on for this evening and offered support. Reviewed what to expect with breast feeding over the next 24-48 hours including breast care, process of lactogenesis, what to expect with  feeding behaviors, how to know that baby is getting enough at the breast including daily goals/expectations for infant feedings, output, and weight trends. Encouraged to continue to offer the breast when infant first begins to wake and show early hunger cues, and every 2-3 hours if baby is sleepy and without feeding cues. Gave tips to wake sleepy baby as needed, and encouraged much hand expression and STS contact with attempts to offer the breast. Reviewed what to expect with cluster feeding and the important role it plays on milk supply. Instructed that baby should have a minimum of 8-12 good feedings daily in a 24 hour period after the first DOL. Gave tips for obtaining VALENTIN to the breast, and educated on how a good latch should look and feel vs a shallow latch. Instruction provided on how to break the latch as needed if ever experiencing discomfort or pain, and explained that nipple should be rounded when baby releases from the breast. Name and number provided on whiteboard with LC's hours for the day. Encouraged to call for [address] to assess latch and for f/u support prn. Response: Verbalized understanding of teaching provided and comfortable with breast feeding so far. Will call for f/u support prn.

## 2023-08-17 NOTE — CONSULTS
Clinical Ethics Consultation Note    ERD Advisement    Contacted by Dr. Mary Hogue on 2023 for this 33F who has requested a salpingectomy to reduce the risk of cancer. The patient will have repeat . The patient has increased risk for breast, ovarian, and/or uterine cancer due to family history of uterine cancer. The patient has been counseled regarding irreversible infertility. This procedure is consistent with the ERDs, as there is a strong likelihood of existing pathology in her fallopian tubes, and the procedure is morally justified. For questions or concerns regarding this consultation, please call me directly.     Colette King, Director of [address] [place] [address], [phone]    Electronically signed by Colette King on 2023 at 1:40 PM

## 2023-08-17 NOTE — ANESTHESIA POSTPROCEDURE EVALUATION
Department of Anesthesiology  Postprocedure Note    Patient: Krystian Camacho  MRN: [id number]  YOB: 1990  Date of evaluation: 2023      Procedure Summary     Date: 23 Room / Location: [facility] L[facility] OR [address] [place] [address]    Anesthesia Start: 1105 Anesthesia Stop: 1233    Procedure:  SECTION (Abdomen) Diagnosis:       S/P repeat low transverse       (repeat, bilateral salpingectomy- family history of cancer)    Surgeons: Jessica Gilbert DO Responsible Provider: Rene Sparks MD    Anesthesia Type: spinal ASA Status: 2 - Emergent          Anesthesia Type: No value filed.     Pebbles Phase I: Pebbles Score: 9    Pebbles Phase II: Pebbles Score: 9      Anesthesia Post Evaluation    Patient location during evaluation: bedside  Patient participation: complete - patient participated  Level of consciousness: awake and alert  Nausea & Vomiting: no nausea and no vomiting  Complications: no  Cardiovascular status: hemodynamically stable  Hydration status: stable  Pain management: adequate and satisfactory to patient

## 2023-08-17 NOTE — FLOWSHEET NOTE
Patient up to bathroom with assist x 2 . Steady gait. Sis care given with instruction. Gown and bed linens changed comfort pad placed on bed, while patient washing hand, had large emesis in garbage can while up . States feels better after emesis. assisted back to bed tolerated well.

## 2023-08-17 NOTE — LACTATION NOTE
This note was copied from a baby's chart. Lactation Progress Note      Data:    Follow up consult for multip on DOS with an infant born at 39.0 weeks gestation. MOB breast fed her first for 6 months. That infant was in SCN so mother had to initiate milk production via breast pump & states she struggled with low supply. MOB reports this baby fed well after delivery and has had several good feeds since. Action:    Introduced self to patient as lactation, name and phone number written on white board in room. Reviewed with mother what to expect over the next  24-48 hours with infant feedings, infant output, how to know infant is getting enough, the importance of a deep latch and how to achieve it, how to break suction and try again if latch is shallow, normal  behavior, how to wake a sleepy infant to feed, how the breasts work to make milk, protecting milk supply, breastfeeding recommendations for exclusivity and duration, what to expect with cluster feeding, how to hand express colostrum, and breast care. Reviewed infant feeding cues and encouraged mother to allow infant to breast feed on demand anytime feeding cues are shown and if no feeding cues are shown to attempt to wake infant to feed every 2-3 hours. If infant is still too sleepy to latch to hand express colostrum into infants mouth for about ten minutes, then try again in 2-3 hours. After the first day of life to breast feed a minimum of 8-12 times a day per 24 hour period. Also encouraged mother to avoid giving infant a pacifier, bottle, or pump for at least the first two weeks of life or until breast feeding is well established. Encouraged good hydration, nutrition, and rest, and to keep taking prenatal vitamin while lactating. Encouraged much skin to skin between mother and infant and father and infant. Breast feeding log reviewed, all questions answered. Mother encouraged to call lactation for F/U care as needed.       Response: MOB verbalized an understanding of education provided and will call for assistance as needed.

## 2023-08-18 VITALS
WEIGHT: 209 LBS | TEMPERATURE: 98.2 F | BODY MASS INDEX: 33.59 KG/M2 | DIASTOLIC BLOOD PRESSURE: 58 MMHG | RESPIRATION RATE: 18 BRPM | SYSTOLIC BLOOD PRESSURE: 110 MMHG | HEIGHT: 66 IN | HEART RATE: 79 BPM | OXYGEN SATURATION: 98 %

## 2023-08-18 LAB
BASOPHILS # BLD: 0 K/UL (ref 0–0.2)
BASOPHILS NFR BLD: 0.5 %
DEPRECATED RDW RBC AUTO: 13.6 % (ref 12.4–15.4)
EOSINOPHIL # BLD: 0.2 K/UL (ref 0–0.6)
EOSINOPHIL NFR BLD: 2.7 %
HCT VFR BLD AUTO: 33.8 % (ref 36–48)
HGB BLD-MCNC: 11.6 G/DL (ref 12–16)
LYMPHOCYTES # BLD: 2.1 K/UL (ref 1–5.1)
LYMPHOCYTES NFR BLD: 23.6 %
MCH RBC QN AUTO: 29.9 PG (ref 26–34)
MCHC RBC AUTO-ENTMCNC: 34.3 G/DL (ref 31–36)
MCV RBC AUTO: 87.3 FL (ref 80–100)
MONOCYTES # BLD: 0.7 K/UL (ref 0–1.3)
MONOCYTES NFR BLD: 7.8 %
NEUTROPHILS # BLD: 5.7 K/UL (ref 1.7–7.7)
NEUTROPHILS NFR BLD: 65.4 %
PLATELET # BLD AUTO: 202 K/UL (ref 135–450)
PMV BLD AUTO: 8.1 FL (ref 5–10.5)
RBC # BLD AUTO: 3.87 M/UL (ref 4–5.2)
WBC # BLD AUTO: 8.8 K/UL (ref 4–11)

## 2023-08-18 PROCEDURE — 85025 COMPLETE CBC W/AUTO DIFF WBC: CPT

## 2023-08-18 PROCEDURE — 99024 POSTOP FOLLOW-UP VISIT: CPT | Performed by: OBSTETRICS & GYNECOLOGY

## 2023-08-18 PROCEDURE — 6370000000 HC RX 637 (ALT 250 FOR IP): Performed by: OBSTETRICS & GYNECOLOGY

## 2023-08-18 PROCEDURE — 36415 COLL VENOUS BLD VENIPUNCTURE: CPT

## 2023-08-18 RX ORDER — IBUPROFEN 200 MG
TABLET ORAL 2 TIMES DAILY
Status: DISCONTINUED | OUTPATIENT
Start: 2023-08-18 | End: 2023-08-18 | Stop reason: HOSPADM

## 2023-08-18 RX ORDER — IBUPROFEN 600 MG/1
600 TABLET ORAL EVERY 8 HOURS SCHEDULED
Qty: 120 TABLET | Refills: 3 | Status: SHIPPED | OUTPATIENT
Start: 2023-08-18

## 2023-08-18 RX ORDER — OXYCODONE HYDROCHLORIDE AND ACETAMINOPHEN 5; 325 MG/1; MG/1
1 TABLET ORAL EVERY 6 HOURS PRN
Qty: 28 TABLET | Refills: 0 | Status: SHIPPED | OUTPATIENT
Start: 2023-08-18 | End: 2023-08-25

## 2023-08-18 RX ORDER — PSEUDOEPHEDRINE HCL 30 MG
100 TABLET ORAL 2 TIMES DAILY PRN
Qty: 60 CAPSULE | Refills: 0 | Status: SHIPPED | OUTPATIENT
Start: 2023-08-18

## 2023-08-18 RX ADMIN — METFORMIN HYDROCHLORIDE 1 TABLET: 500 TABLET, EXTENDED RELEASE ORAL at 09:18

## 2023-08-18 RX ADMIN — DOCUSATE SODIUM 100 MG: 100 CAPSULE, LIQUID FILLED ORAL at 09:19

## 2023-08-18 RX ADMIN — IBUPROFEN 600 MG: 600 TABLET ORAL at 05:27

## 2023-08-18 RX ADMIN — ACETAMINOPHEN 1000 MG: 500 TABLET ORAL at 09:18

## 2023-08-18 RX ADMIN — ACETAMINOPHEN 1000 MG: 500 TABLET ORAL at 00:56

## 2023-08-18 ASSESSMENT — PAIN DESCRIPTION - LOCATION
LOCATION: ABDOMEN;INCISION
LOCATION: ABDOMEN

## 2023-08-18 ASSESSMENT — PAIN DESCRIPTION - DESCRIPTORS
DESCRIPTORS: SORE
DESCRIPTORS: CRAMPING

## 2023-08-18 ASSESSMENT — PAIN - FUNCTIONAL ASSESSMENT: PAIN_FUNCTIONAL_ASSESSMENT: ACTIVITIES ARE NOT PREVENTED

## 2023-08-18 ASSESSMENT — PAIN SCALES - GENERAL
PAINLEVEL_OUTOF10: 0
PAINLEVEL_OUTOF10: 4
PAINLEVEL_OUTOF10: 2

## 2023-08-18 NOTE — LACTATION NOTE
This note was copied from a baby's chart. Lactation Progress Note      Data:   F/U with multip 2PO with breastfeeding and lactation rounds. Infant doing well with breastfeeding. MOB with some concern regarding her right breast. Breast noted to be filling, has firm spot on about 0900 position. Infant actively nursing at time of consult with close position noted and deep latch observed. MOB confirms latch is comfortable. Infant output established weight loss @4%, urine x 8, stool x 7. Action: Introduced self to patient as [address] for the day. Name and number placed on whiteboard. Confirmed latch and position, adan noted with srs observed with as. LC palpated right breast, breast filling with firm area noted to about 0900 position. No redness on breast. MOB was encouraged to provide continued massage to breast as infant is nursing, and then after nursing to place ice on side of breast for 10-15 minutes to help with pain, and decrease swelling. MOB was also given instruction prior to next feeding to place warm wash cloth or even get into shower and allow warm water to hit breast and attempt to massage to help break up clog and keep milk flowing. Continue to feed infant on demand, and apply ice as needed for 10-15 minutes. Encouraged mob to call [address] back to room with worsening symptoms or other questions or concerns. BF education reviewed with patient and what to expect over then next 1-2 weeks with mature milk production, infant feedings, infant output, breast care, engorgement prevention, pacifier, bottle use, and pumping information. Discharge binder also reviewed with patient with f/u care and resources provided. All questions answered at this time. RN updated with education that was provided to patient. Patient instructed to call for f/u care as needed. Response: MOB verbalizes understanding of bf education that was provided.  Feels comfortable with latching infant to both breast. Will f/u with outpatient

## 2023-08-18 NOTE — PLAN OF CARE
Problem: Pain  Goal: Verbalizes/displays adequate comfort level or baseline comfort level  8/18/2023 0803 by Bee Brush RN  Outcome: Progressing  8/18/2023 0328 by Gopal Norris RN  Outcome: Progressing

## 2023-08-18 NOTE — FLOWSHEET NOTE
Discharge Phone Call    Patient Name: Krystian Camacho     [facility] Care Provider: Jessica Gilbert DO Discharge Date: 2023    Disposition of baby:    Phone Number: [phone] (home)     Attempts to Contact:  Date:    Caller  Date:    Caller  Date:    Caller    Information for the patient's :  Ashley [facility] [[id number]]   Delivery Method: , Low Transverse     1. Now that you are at home is your pain being well controlled? Y/N   If no, instruct to call       provider. 2. Are you breastfeeding? Y/N    Do you need any extra support from our lactation staff? Y/N    If yes, provide number for lactation. 3. Have you made or already had your first appointment with the baby's doctor? Y/N   If no, do      you know when to schedule it? Y/N    4. Have you scheduled your follow-up appointment? Y/N  If no, do you know when to schedule       it? Y/N   If no, they can find it on printed discharge instructions. 5. Did staff discuss safe sleep during your stay? Y/N   6. Did we explain things in a way you could understand? Y/N  7. Were we respectful of your preferences for labor and birth and include you in the plan of       care? Y/N  If no, please explain _______________________________________________  8. Is there anyone in particular you would like to mention who provided care for you? _______      _________________________________________________________________________     9. Were you given a Post-Birth Warning Signs handout? Y/N  Do you have it somewhere      easily accessible? Y/N  If no, please send them a copy and ask them to put it somewhere      easily found. 10. Have you been crying excessively, having anger or mood swings that feel out of control, or       feel like you can't cope with caring for yourself or baby? Y/N   If yes, they may be showing       signs of postpartum depression and should call provider.  There is also a        depression test on page C5 in their discharge booklet they can take. 13. Do you have any other questions or concerns I can address today?  Y/N  ______________      _________________________________________________________________________    Information provided during call :_________________________________________________  ___________________________________________________________________________    Call completed by:____________________________    Date:_________ Time:___________

## 2023-08-18 NOTE — PLAN OF CARE
Problem: Pain  Goal: Verbalizes/displays adequate comfort level or baseline comfort level  8/18/2023 1232 by Bethany Herbert RN  Outcome: Completed  Flowsheets (Taken 8/18/2023 0915)  Verbalizes/displays adequate comfort level or baseline comfort level: Encourage patient to monitor pain and request assistance  8/18/2023 0803 by Bethany Herbert RN  Outcome: Progressing  8/18/2023 0328 by Juvencio Lees RN  Outcome: Progressing

## 2023-08-18 NOTE — DISCHARGE INSTRUCTIONS
Thank you for the opportunity to care for you and your family. We hope that you are happy with the care we provided during your stay in the [facility]. We want to ensure that you have the help that you need when you leave the hospital. If there is anything that we can assist you with please let us know. Breastfeeding mothers may contact our lactation specialists with any problems or questions. [facility] lactation services phone number is [phone]. Leave a message and your call will be returned. Please refer to the information provided in the postpartum care booklet. The following are warning signs to remember. Call 911 if you have:    Chest pain or pressure  Shortness of breath, even at rest  Thoughts of hurting yourself or others  Seizures    Call your healthcare provider if you have:    Temperature of 100.4 degrees or higher  Stitches that are not healing             --Swelling, bleeding, drainage, foul odor, redness or warmth in/around your                 stitches, staples, or incision (scar)               -- Bad smelling blood or discharge from the vagina  Vaginal bleeding that has increased             --Soaking through one pad in an hour             --You are passing clots larger than the size of a lemon. Red, warm tender area(s) in your breast or calf. Headache that does not get better, even after taking medicine; or headache with vision changes    Remember to notify all healthcare providers from your date of delivery up to one year after birth! CARING FOR YOURSELF      DIET/ACTIVITY    Eat a well balanced diet focusing on food high in fiber and protein. Drink plenty of fluids, especially water. To avoid constipation you may take a mild stool softener as recommended by your doctor or midwife. Gradually increase your activity. Resume an exercise regime only after being advised by your doctor or midwife.   When sitting or lying down, keep your legs elevated to

## 2023-08-18 NOTE — DISCHARGE SUMMARY
[facility] Ob/Gyn  Obstetric Discharge Summary        Admitting Diagnosis:   Maximiliano Gottlieb is a 28 y.o. Mckenzie Dickenson at 39w0d   O pos / RI / GBS neg  History of prior  delivery  Maternal obesity  Circumvallate placenta  Family hx of GYN cancer  Resolved low lying placenta    Discharge Diagnosis:  Maximiliano Gottlieb is a 28 y.o. Mckenzie Dickenson at 39w0d   O pos / RI / GBS neg  History of prior  delivery  Maternal obesity  Circumvallate placenta  Family hx of GYN cancer  Resolved low lying placenta    Procedures:   Repeat low transverse  section  Prophylactic bilateral salpingectomy     Referrals:    - Lactation Consultant       Information:               - Delivery Time / Date:  on               - APGARS: 8, 9               - Birth weight: 8 lb 2.9 oz               - Feeding: breastfeeding               - Gender: male, plans for circumcision - Deferred due to size    Discharge Instructions:  Please call for increased pain not controlled by pain medications, significant vaginal bleeding greater than 1 pad/hour, temperature greater than 101 degrees F or any other concerns. Plan to follow up in 1-2 weeks. Diet:common adult    Activity:  Increase activity gradually. No heavy lifting or driving for 2 weeks. Pelvic rest for 6 weeks. No intercourse, tampons, douching, baths.        Medications:   - Motrin    - Percocet    - Colace     Disposition: Home    Condition on discharge: Stable    Follow up: in 1-2 week(s)    David Ayala DO

## 2023-08-29 ENCOUNTER — POSTPARTUM VISIT (OUTPATIENT)
Dept: OBGYN CLINIC | Age: 33
End: 2023-08-29
Payer: COMMERCIAL

## 2023-08-29 VITALS
HEART RATE: 80 BPM | SYSTOLIC BLOOD PRESSURE: 100 MMHG | WEIGHT: 187.4 LBS | BODY MASS INDEX: 30.25 KG/M2 | DIASTOLIC BLOOD PRESSURE: 66 MMHG | TEMPERATURE: 97.2 F

## 2023-08-29 DIAGNOSIS — Z90.79 STATUS POST BILATERAL SALPINGECTOMY: ICD-10-CM

## 2023-08-29 DIAGNOSIS — Z98.891 HISTORY OF C-SECTION: ICD-10-CM

## 2023-08-29 DIAGNOSIS — Z98.891 S/P REPEAT LOW TRANSVERSE C-SECTION: ICD-10-CM

## 2023-08-29 DIAGNOSIS — Z80.9 FAMILY HISTORY OF CANCER: ICD-10-CM

## 2023-08-29 DIAGNOSIS — Z98.891 PREVIOUS CESAREAN SECTION: ICD-10-CM

## 2023-08-29 PROCEDURE — 99213 OFFICE O/P EST LOW 20 MIN: CPT | Performed by: OBSTETRICS & GYNECOLOGY

## 2023-09-05 PROBLEM — O43.113 CIRCUMVALLATE PLACENTA IN THIRD TRIMESTER: Status: RESOLVED | Noted: 2023-05-08 | Resolved: 2023-09-05

## 2023-09-05 PROBLEM — O44.40 LOW-LYING PLACENTA: Status: RESOLVED | Noted: 2023-07-18 | Resolved: 2023-09-05

## 2023-09-05 PROBLEM — Z98.891 HISTORY OF C-SECTION: Status: RESOLVED | Noted: 2023-06-25 | Resolved: 2023-09-05

## 2023-09-05 PROBLEM — O99.213 MATERNAL OBESITY SYNDROME IN THIRD TRIMESTER: Status: RESOLVED | Noted: 2023-05-08 | Resolved: 2023-09-05

## 2023-09-05 PROBLEM — Z34.83 PRENATAL CARE, SUBSEQUENT PREGNANCY IN THIRD TRIMESTER: Status: RESOLVED | Noted: 2023-05-08 | Resolved: 2023-09-05

## 2023-09-05 PROBLEM — O44.02 PLACENTA PREVIA IN SECOND TRIMESTER: Status: RESOLVED | Noted: 2023-06-25 | Resolved: 2023-09-05

## 2023-09-05 ASSESSMENT — ENCOUNTER SYMPTOMS
SHORTNESS OF BREATH: 0
ABDOMINAL PAIN: 0
NAUSEA: 0
GASTROINTESTINAL NEGATIVE: 1
VOMITING: 0
RESPIRATORY NEGATIVE: 1
DIARRHEA: 0
CONSTIPATION: 0

## 2023-09-06 NOTE — PROGRESS NOTES
Subjective:      Patient ID: Sánchez Liu is a 28 y.o. female. HPI  27 y/o  female presents for postpartum visit. Patient is 2 weeks S/P repeat  section at 39w0d  on 23. The pregnancy was complicated by: Maternal obesity (PG BMI 30), Circumvallate placenta, Hx CS x 1, family hx of GYN CA, Low lying placenta (resolved). Doing well today. Admits to some bleeding--spotting. Admits to some vulvar itching. Soreness has improved. Pain control has been adequate. Patient denies headache, change in vision, fever, chills, chest pain, shortness of breath, nausea, vomiting, diarrhea, constipation, dysuria and hematuria. The patient denies dizziness with ambulation or calf tenderness. Energy has improved. Denies signs and symptoms of depression. Breast feeding. Review of Systems   Constitutional: Negative. Negative for activity change, appetite change, chills, fatigue, fever and unexpected weight change. Eyes:  Negative for visual disturbance. Respiratory: Negative. Negative for shortness of breath. Cardiovascular: Negative. Negative for chest pain. Gastrointestinal: Negative. Negative for abdominal pain, constipation, diarrhea, nausea and vomiting. Endocrine: Negative for cold intolerance and heat intolerance. Genitourinary:  Positive for vaginal bleeding. Negative for difficulty urinating, dysuria, hematuria, pelvic pain, vaginal discharge and vaginal pain. Skin: Negative. Neurological:  Negative for headaches. Hematological:  Does not bruise/bleed easily. Psychiatric/Behavioral: Negative. Negative for dysphoric mood and suicidal ideas. The patient is not nervous/anxious. Objective:   Physical Exam  Vitals and nursing note reviewed. Constitutional:       General: She is not in acute distress. Appearance: Normal appearance. She is well-developed. She is not ill-appearing, toxic-appearing or diaphoretic.    Pulmonary:      Effort: Pulmonary

## 2023-09-27 ENCOUNTER — POSTPARTUM VISIT (OUTPATIENT)
Dept: OBGYN CLINIC | Age: 33
End: 2023-09-27
Payer: COMMERCIAL

## 2023-09-27 VITALS
TEMPERATURE: 97.3 F | WEIGHT: 185.6 LBS | SYSTOLIC BLOOD PRESSURE: 110 MMHG | HEART RATE: 79 BPM | BODY MASS INDEX: 29.96 KG/M2 | DIASTOLIC BLOOD PRESSURE: 64 MMHG

## 2023-09-27 DIAGNOSIS — Z90.79 STATUS POST BILATERAL SALPINGECTOMY: ICD-10-CM

## 2023-09-27 DIAGNOSIS — Z98.891 HISTORY OF C-SECTION: ICD-10-CM

## 2023-09-27 DIAGNOSIS — Z98.891 S/P REPEAT LOW TRANSVERSE C-SECTION: ICD-10-CM

## 2023-09-27 DIAGNOSIS — Z80.9 FAMILY HISTORY OF CANCER: ICD-10-CM

## 2023-09-27 PROCEDURE — 99213 OFFICE O/P EST LOW 20 MIN: CPT | Performed by: OBSTETRICS & GYNECOLOGY

## 2023-09-27 PROCEDURE — G8417 CALC BMI ABV UP PARAM F/U: HCPCS | Performed by: OBSTETRICS & GYNECOLOGY

## 2023-09-27 PROCEDURE — 1036F TOBACCO NON-USER: CPT | Performed by: OBSTETRICS & GYNECOLOGY

## 2023-09-27 PROCEDURE — G8428 CUR MEDS NOT DOCUMENT: HCPCS | Performed by: OBSTETRICS & GYNECOLOGY

## 2023-09-27 ASSESSMENT — ENCOUNTER SYMPTOMS
DIARRHEA: 0
RESPIRATORY NEGATIVE: 1
VOMITING: 0
GASTROINTESTINAL NEGATIVE: 1
NAUSEA: 0
SHORTNESS OF BREATH: 0
ABDOMINAL PAIN: 0
CONSTIPATION: 0

## 2023-09-27 NOTE — PROGRESS NOTES
Subjective:      Patient ID: Matt Pepe is a [address] y.o. female. HPI  [address] y/o  female presents for postpartum visit. Patient is 6 weeks S/P repeat  section at 39w0d  on 23. The pregnancy was complicated by: Maternal obesity (PG BMI 30), Circumvallate placenta, Hx CS x 1, family hx of GYN CA, Low lying placenta (resolved). Doing well today. Admits bleeding has resolved. Admits to some vulvar itching. Soreness has improved. Pain control has been adequate. Patient denies headache, change in vision, fever, chills, chest pain, shortness of breath, nausea, vomiting, diarrhea, constipation, dysuria and hematuria. The patient denies dizziness with ambulation or calf tenderness. Energy has improved. Denies signs and symptoms of depression. Breast feeding. Review of Systems   Constitutional: Negative. Negative for activity change, appetite change, chills, fatigue, fever and unexpected weight change. Eyes:  Negative for visual disturbance. Respiratory: Negative. Negative for shortness of breath. Cardiovascular: Negative. Negative for chest pain. Gastrointestinal: Negative. Negative for abdominal pain, constipation, diarrhea, nausea and vomiting. Endocrine: Negative for cold intolerance and heat intolerance. Genitourinary:  Positive for vaginal bleeding. Negative for difficulty urinating, dysuria, hematuria, pelvic pain, vaginal discharge and vaginal pain. Skin: Negative. Neurological:  Negative for headaches. Hematological:  Does not bruise/bleed easily. Psychiatric/Behavioral: Negative. Negative for dysphoric mood and suicidal ideas. The patient is not nervous/anxious. Objective:   Physical Exam  Vitals and nursing note reviewed. Constitutional:       General: She is not in acute distress. Appearance: Normal appearance. She is well-developed. She is not ill-appearing, toxic-appearing or diaphoretic.    Pulmonary:      Effort: Pulmonary

## 2023-10-18 DIAGNOSIS — J30.2 SEASONAL ALLERGIES: ICD-10-CM

## 2023-10-19 RX ORDER — LORATADINE 10 MG/1
TABLET ORAL
Qty: 90 TABLET | Refills: 0 | Status: SHIPPED | OUTPATIENT
Start: 2023-10-19

## 2024-10-29 ENCOUNTER — OFFICE VISIT (OUTPATIENT)
Dept: OBGYN CLINIC | Age: 34
End: 2024-10-29

## 2024-10-29 VITALS
WEIGHT: 196 LBS | DIASTOLIC BLOOD PRESSURE: 78 MMHG | SYSTOLIC BLOOD PRESSURE: 118 MMHG | BODY MASS INDEX: 31.64 KG/M2 | HEART RATE: 79 BPM

## 2024-10-29 DIAGNOSIS — N84.1 CERVICAL POLYP: ICD-10-CM

## 2024-10-29 DIAGNOSIS — Z98.891 HISTORY OF C-SECTION: ICD-10-CM

## 2024-10-29 DIAGNOSIS — R22.32 MASS OF LEFT AXILLA: ICD-10-CM

## 2024-10-29 DIAGNOSIS — Z01.419 WOMEN'S ANNUAL ROUTINE GYNECOLOGICAL EXAMINATION: Primary | ICD-10-CM

## 2024-10-29 DIAGNOSIS — Z12.4 PAP SMEAR FOR CERVICAL CANCER SCREENING: ICD-10-CM

## 2024-10-29 DIAGNOSIS — Z90.79 STATUS POST BILATERAL SALPINGECTOMY: ICD-10-CM

## 2024-10-29 DIAGNOSIS — Z12.39 SCREENING BREAST EXAMINATION: ICD-10-CM

## 2024-10-29 NOTE — PROGRESS NOTES
[facility] Ob/Gyn   Annual Exam        CC:   Chief Complaint   Patient presents with    Annual Exam     Lump under armpit left side.        HPI:  34 y.o.  presents for her gynecologic annual exam.  Admits to a lump under Left armpit } no longer there   GGM had cervical cancer, GF with stomach cancer   Moving to [place] in a month!     Health Maintenance:  Safe Enviroment: Y  Sexually Active: Y   N sexual problems  Contraception: Bilateral Salpingectomy     Screening:  Last pap smear:  -- NILM   History of abnormal pap smears: Denies  STI History: denies       Review of Systems:   Constitutional: Negative for appetite change, chills and fever.   HENT: Negative for congestion, sneezing and sore throat.    Eyes: Negative for visual disturbance.   Respiratory: Negative for chest tightness, shortness of breath and wheezing.    Cardiovascular: Negative for chest pain, palpitations and leg swelling.   Gastrointestinal: Negative for abdominal pain, diarrhea, nausea and vomiting.   Endocrine: Negative for heat intolerance.   Genitourinary: Negative for difficulty urinating, dyspareunia, dysuria, menstrual problem and pelvic pain.   Musculoskeletal: Negative for back pain, neck pain and neck stiffness.   Skin: Negative for color change, pallor and rash.   Allergic/Immunologic: Negative for environmental allergies, food allergies and immunocompromised state.   Neurological: Negative for light-headedness, numbness and headaches.   Hematological: Does not bruise/bleed easily.   Psychiatric/Behavioral: Negative for behavioral problems, sleep disturbance and suicidal ideas.     Primary Care Physician: No primary care provider on file.    Obstetric History  OB History    Para Term  AB Living   2 2 2     2   SAB IAB Ectopic Molar Multiple Live Births           0 2      # Outcome Date GA Lbr Omid/2nd Weight Sex Type Anes PTL Lv   2 Term 23 39w0d  3.71 kg (8 lb 2.9 oz) M CS-LTranv Spinal N ANN   1 Term

## 2024-10-31 PROBLEM — Z30.02 ENCOUNTER FOR COUNSELING AND INSTRUCTION IN NATURAL FAMILY PLANNING TO AVOID PREGNANCY: Status: ACTIVE | Noted: 2024-10-31

## 2024-10-31 PROBLEM — R22.32 MASS OF LEFT AXILLA: Status: ACTIVE | Noted: 2024-10-31

## 2024-10-31 PROBLEM — Z30.02 ENCOUNTER FOR COUNSELING AND INSTRUCTION IN NATURAL FAMILY PLANNING TO AVOID PREGNANCY: Status: RESOLVED | Noted: 2024-10-31 | Resolved: 2024-10-31

## 2024-10-31 PROBLEM — Z98.891 HISTORY OF C-SECTION: Status: ACTIVE | Noted: 2024-10-31

## 2024-10-31 PROBLEM — N84.1 CERVICAL POLYP: Status: ACTIVE | Noted: 2024-10-31

## (undated) DEVICE — PAD,NON-ADHERENT,3X8,STERILE,LF,1/PK: Brand: MEDLINE

## (undated) DEVICE — SUTURE VCRL SZ 1 L36IN ABSRB VLT L36MM CT-1 1/2 CIR J347H

## (undated) DEVICE — SUTURE VCRL SZ 0 L36IN ABSRB UD L36MM CT-1 1/2 CIR J946H

## (undated) DEVICE — GLOVE SURG SZ 65 THK91MIL LTX FREE SYN POLYISOPRENE

## (undated) DEVICE — DRESSING COMP IS W4XL10IN PD W2XL8IN CNTCT LAYR ADH

## (undated) DEVICE — Z INACTIVE NO ACTIVE SUPPLIER APPLICATOR MEDICATED 26 CC TINT HI-LITE ORNG STRL CHLORAPREP

## (undated) DEVICE — BLADE CLIPPER GEN PURP NS

## (undated) DEVICE — GARMENT COMPR L FOR 23IN CALF FLOTRN

## (undated) DEVICE — SUTURE ABSORBABLE BRAIDED 2-0 CT-1 27 IN UD VICRYL J259H

## (undated) DEVICE — TRAY URIN CATH 16FR DRNGE BG STATLOK STBL DEV F SURSTP

## (undated) DEVICE — Device

## (undated) DEVICE — SUTURE MCRYL SZ 3-0 L27IN ABSRB UD L60MM KS STR REV CUT Y523H

## (undated) DEVICE — S/USE RESUS KIT W/O MASK (10): Brand: FISHER & PAYKEL HEALTHCARE

## (undated) DEVICE — SOLUTION IV IRRIG POUR BRL 0.9% SODIUM CHL 2F7124

## (undated) DEVICE — 3M™ STERI-STRIP™ COMPOUND BENZOIN TINCTURE 40 BAGS/CARTON 4 CARTONS/CASE C1544: Brand: 3M™ STERI-STRIP™